# Patient Record
Sex: FEMALE | Race: ASIAN | NOT HISPANIC OR LATINO
[De-identification: names, ages, dates, MRNs, and addresses within clinical notes are randomized per-mention and may not be internally consistent; named-entity substitution may affect disease eponyms.]

---

## 2021-11-23 ENCOUNTER — TRANSCRIPTION ENCOUNTER (OUTPATIENT)
Age: 36
End: 2021-11-23

## 2021-11-23 ENCOUNTER — LABORATORY RESULT (OUTPATIENT)
Age: 36
End: 2021-11-23

## 2021-11-23 ENCOUNTER — APPOINTMENT (OUTPATIENT)
Dept: FAMILY MEDICINE | Facility: CLINIC | Age: 36
End: 2021-11-23
Payer: MEDICAID

## 2021-11-23 VITALS
SYSTOLIC BLOOD PRESSURE: 130 MMHG | TEMPERATURE: 97.2 F | HEART RATE: 102 BPM | OXYGEN SATURATION: 98 % | DIASTOLIC BLOOD PRESSURE: 82 MMHG

## 2021-11-23 VITALS — BODY MASS INDEX: 32.96 KG/M2 | HEIGHT: 67 IN | WEIGHT: 210 LBS

## 2021-11-23 DIAGNOSIS — F17.200 NICOTINE DEPENDENCE, UNSPECIFIED, UNCOMPLICATED: ICD-10-CM

## 2021-11-23 DIAGNOSIS — Z82.49 FAMILY HISTORY OF ISCHEMIC HEART DISEASE AND OTHER DISEASES OF THE CIRCULATORY SYSTEM: ICD-10-CM

## 2021-11-23 DIAGNOSIS — Z83.3 FAMILY HISTORY OF DIABETES MELLITUS: ICD-10-CM

## 2021-11-23 DIAGNOSIS — Z78.9 OTHER SPECIFIED HEALTH STATUS: ICD-10-CM

## 2021-11-23 PROCEDURE — 99385 PREV VISIT NEW AGE 18-39: CPT

## 2021-11-23 RX ORDER — TRIAMCINOLONE ACETONIDE 1 MG/G
0.1 OINTMENT TOPICAL
Qty: 1 | Refills: 1 | Status: ACTIVE | COMMUNITY
Start: 2021-11-23 | End: 1900-01-01

## 2021-11-23 NOTE — PHYSICAL EXAM
[Normal Sclera/Conjunctiva] : normal sclera/conjunctiva [No Focal Deficits] : no focal deficits [Normal] : affect was normal and insight and judgment were intact [de-identified] : dry thickened skin on palms, knuckles

## 2021-11-23 NOTE — HISTORY OF PRESENT ILLNESS
[de-identified] : New patient visit\par Moved from  [place] one year ago.\par High BP through pregnancy. [age] year old, no medications post partum.\par No medication for achiness, pain hurt. \par Gained weight, trying to eat healthier now\par Migraines, ibuprofen 800 mg for relief. \par Dry hands, eczema

## 2021-11-23 NOTE — HEALTH RISK ASSESSMENT
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

## 2021-11-24 LAB
ALBUMIN SERPL ELPH-MCNC: 4.3 G/DL
ALP BLD-CCNC: 161 U/L
ALT SERPL-CCNC: 8 U/L
ANION GAP SERPL CALC-SCNC: 13 MMOL/L
AST SERPL-CCNC: 10 U/L
B BURGDOR IGG+IGM SER QL IB: NORMAL
BASOPHILS # BLD AUTO: 0.12 K/UL
BASOPHILS NFR BLD AUTO: 1 %
BILIRUB SERPL-MCNC: 0.2 MG/DL
BUN SERPL-MCNC: 7 MG/DL
CALCIUM SERPL-MCNC: 9.5 MG/DL
CHLORIDE SERPL-SCNC: 102 MMOL/L
CHOLEST SERPL-MCNC: 174 MG/DL
CO2 SERPL-SCNC: 23 MMOL/L
CREAT SERPL-MCNC: 0.64 MG/DL
EOSINOPHIL # BLD AUTO: 0.23 K/UL
EOSINOPHIL NFR BLD AUTO: 2 %
ERYTHROCYTE [SEDIMENTATION RATE] IN BLOOD BY WESTERGREN METHOD: 41 MM/HR
ESTIMATED AVERAGE GLUCOSE: 117 MG/DL
GLUCOSE SERPL-MCNC: 76 MG/DL
HBA1C MFR BLD HPLC: 5.7 %
HCT VFR BLD CALC: 41 %
HDLC SERPL-MCNC: 56 MG/DL
HGB BLD-MCNC: 12.5 G/DL
IMM GRANULOCYTES NFR BLD AUTO: 0.3 %
LDLC SERPL CALC-MCNC: 107 MG/DL
LYMPHOCYTES # BLD AUTO: 3.2 K/UL
LYMPHOCYTES NFR BLD AUTO: 28 %
MAN DIFF?: NORMAL
MCHC RBC-ENTMCNC: 26.2 PG
MCHC RBC-ENTMCNC: 30.5 GM/DL
MCV RBC AUTO: 86 FL
MONOCYTES # BLD AUTO: 0.76 K/UL
MONOCYTES NFR BLD AUTO: 6.6 %
NEUTROPHILS # BLD AUTO: 7.09 K/UL
NEUTROPHILS NFR BLD AUTO: 62.1 %
NONHDLC SERPL-MCNC: 118 MG/DL
PLATELET # BLD AUTO: 369 K/UL
POTASSIUM SERPL-SCNC: 4.8 MMOL/L
PROT SERPL-MCNC: 7.6 G/DL
RBC # BLD: 4.77 M/UL
RBC # FLD: 14.5 %
RHEUMATOID FACT SER QL: <10 IU/ML
SODIUM SERPL-SCNC: 138 MMOL/L
TRIGL SERPL-MCNC: 57 MG/DL
TSH SERPL-ACNC: 6.68 UIU/ML
WBC # FLD AUTO: 11.43 K/UL

## 2021-11-30 ENCOUNTER — TRANSCRIPTION ENCOUNTER (OUTPATIENT)
Age: 36
End: 2021-11-30

## 2021-11-30 LAB — ANA SER IF-ACNC: NEGATIVE

## 2021-12-07 ENCOUNTER — APPOINTMENT (OUTPATIENT)
Dept: FAMILY MEDICINE | Facility: CLINIC | Age: 36
End: 2021-12-07
Payer: MEDICAID

## 2021-12-07 VITALS
HEART RATE: 74 BPM | OXYGEN SATURATION: 98 % | HEIGHT: 67 IN | DIASTOLIC BLOOD PRESSURE: 80 MMHG | TEMPERATURE: 97.2 F | WEIGHT: 210 LBS | SYSTOLIC BLOOD PRESSURE: 132 MMHG | BODY MASS INDEX: 32.96 KG/M2

## 2021-12-07 PROCEDURE — 99213 OFFICE O/P EST LOW 20 MIN: CPT

## 2021-12-07 NOTE — HISTORY OF PRESENT ILLNESS
[de-identified] : Needs quantiferon and drug screening for job. May need to fill in for care for her mother. \par Has immune titers done previously and has immunity to MMR\par Needs form completed.

## 2021-12-08 ENCOUNTER — APPOINTMENT (OUTPATIENT)
Dept: FAMILY MEDICINE | Facility: CLINIC | Age: 36
End: 2021-12-08

## 2021-12-09 LAB
M TB IFN-G BLD-IMP: NEGATIVE
QUANTIFERON TB PLUS MITOGEN MINUS NIL: 6.84 IU/ML
QUANTIFERON TB PLUS NIL: 0.06 IU/ML
QUANTIFERON TB PLUS TB1 MINUS NIL: -0.03 IU/ML
QUANTIFERON TB PLUS TB2 MINUS NIL: 0 IU/ML

## 2021-12-12 ENCOUNTER — TRANSCRIPTION ENCOUNTER (OUTPATIENT)
Age: 36
End: 2021-12-12

## 2021-12-12 LAB
AMPHET UR-MCNC: NEGATIVE
BARBITURATES UR-MCNC: NEGATIVE
BENZODIAZ UR-MCNC: NEGATIVE
COCAINE METAB.OTHER UR-MCNC: NEGATIVE
CREATININE, URINE: 188.5 MG/DL
METHADONE UR-MCNC: NEGATIVE
METHAQUALONE UR-MCNC: NEGATIVE
OPIATES UR-MCNC: NEGATIVE
PCP UR-MCNC: NEGATIVE
PROPOXYPH UR QL: NEGATIVE
THC UR QL: NEGATIVE

## 2021-12-23 ENCOUNTER — APPOINTMENT (OUTPATIENT)
Dept: FAMILY MEDICINE | Facility: CLINIC | Age: 36
End: 2021-12-23
Payer: MEDICAID

## 2021-12-23 PROCEDURE — 99213 OFFICE O/P EST LOW 20 MIN: CPT | Mod: 95

## 2021-12-23 NOTE — REVIEW OF SYSTEMS
[Nasal Discharge] : nasal discharge [Sore Throat] : sore throat [Postnasal Drip] : postnasal drip [Cough] : cough [Nausea] : nausea [Negative] : Cardiovascular [Fever] : no fever [Chills] : no chills [Fatigue] : no fatigue

## 2021-12-23 NOTE — ASSESSMENT
[FreeTextEntry1] : \par discussed viral vs bacterial infections and discussed OTC treatment options including rest, fluids, hydration, motrin/tylenol PRN \par Use stream inhalation and neti pot PRN\par \par if no improvement in 1 week f/u \par antibiotics sent per above, counseled on antibiotic resistance \par \par sent for covid swab\par if negative start abx

## 2021-12-23 NOTE — HISTORY OF PRESENT ILLNESS
[Home] : at home, [unfilled] , at the time of the visit. [Medical Office: ([facility])___] : at the medical office located in  [Verbal consent obtained from patient] : the patient, [unfilled] [FreeTextEntry8] : Pt presenting for sore throat and phlegm x3 days\par no fevers or chills\par 1 y/.o was sick \par COVID- vaccinated \par trying to get tested, not able to find spot. \par no muscle aches or body aches \par no fatigue \par

## 2021-12-25 ENCOUNTER — TRANSCRIPTION ENCOUNTER (OUTPATIENT)
Age: 36
End: 2021-12-25

## 2021-12-25 LAB
INFLUENZA A RESULT: NOT DETECTED
INFLUENZA B RESULT: NOT DETECTED
RESP SYN VIRUS RESULT: DETECTED
SARS-COV-2 RESULT: NOT DETECTED

## 2022-02-08 ENCOUNTER — APPOINTMENT (OUTPATIENT)
Dept: FAMILY MEDICINE | Facility: CLINIC | Age: 37
End: 2022-02-08
Payer: MEDICAID

## 2022-02-08 DIAGNOSIS — Z86.19 PERSONAL HISTORY OF OTHER INFECTIOUS AND PARASITIC DISEASES: ICD-10-CM

## 2022-02-08 PROCEDURE — 99213 OFFICE O/P EST LOW 20 MIN: CPT | Mod: 95

## 2022-02-08 RX ORDER — AZITHROMYCIN 250 MG/1
250 TABLET, FILM COATED ORAL
Qty: 1 | Refills: 0 | Status: DISCONTINUED | COMMUNITY
Start: 2021-12-23 | End: 2022-02-08

## 2022-02-08 NOTE — HISTORY OF PRESENT ILLNESS
[Home] : at home, [unfilled] , at the time of the visit. [Medical Office: ([facility])___] : at the medical office located in  [FreeTextEntry8] : RSV in December.\par Feeling better but persistent cough. Mucous in throat. \par Took Zpack at  time of diagnosis. \par \par No fatigue or SOB with cough.

## 2022-02-08 NOTE — PHYSICAL EXAM
[No Respiratory Distress] : no respiratory distress  [Normal] : affect was normal and insight and judgment were intact [de-identified] : PND [de-identified] : dry cough

## 2022-03-01 ENCOUNTER — NON-APPOINTMENT (OUTPATIENT)
Age: 37
End: 2022-03-01

## 2022-03-01 ENCOUNTER — APPOINTMENT (OUTPATIENT)
Dept: FAMILY MEDICINE | Facility: CLINIC | Age: 37
End: 2022-03-01
Payer: MEDICAID

## 2022-03-01 VITALS
HEART RATE: 97 BPM | TEMPERATURE: 97.4 F | OXYGEN SATURATION: 98 % | HEIGHT: 67 IN | SYSTOLIC BLOOD PRESSURE: 116 MMHG | BODY MASS INDEX: 31.39 KG/M2 | DIASTOLIC BLOOD PRESSURE: 82 MMHG | WEIGHT: 200 LBS

## 2022-03-01 DIAGNOSIS — Z13.0 ENCOUNTER FOR SCREENING FOR OTHER SUSPECTED ENDOCRINE DISORDER: ICD-10-CM

## 2022-03-01 DIAGNOSIS — Z13.29 ENCOUNTER FOR SCREENING FOR OTHER SUSPECTED ENDOCRINE DISORDER: ICD-10-CM

## 2022-03-01 DIAGNOSIS — L30.9 DERMATITIS, UNSPECIFIED: ICD-10-CM

## 2022-03-01 DIAGNOSIS — Z13.228 ENCOUNTER FOR SCREENING FOR OTHER SUSPECTED ENDOCRINE DISORDER: ICD-10-CM

## 2022-03-01 DIAGNOSIS — Z11.1 ENCOUNTER FOR SCREENING FOR RESPIRATORY TUBERCULOSIS: ICD-10-CM

## 2022-03-01 DIAGNOSIS — Z87.898 PERSONAL HISTORY OF OTHER SPECIFIED CONDITIONS: ICD-10-CM

## 2022-03-01 PROCEDURE — 99214 OFFICE O/P EST MOD 30 MIN: CPT | Mod: 25

## 2022-03-01 PROCEDURE — 93000 ELECTROCARDIOGRAM COMPLETE: CPT

## 2022-03-01 NOTE — PLAN
[FreeTextEntry1] : Blood work done.\par Steroid cream and moisturizer to hands.\par \par Pain relief for costochondritis, topical pain relief.

## 2022-03-01 NOTE — PHYSICAL EXAM
[Normal] : normal rate, regular rhythm, normal S1 and S2 and no murmur heard [de-identified] : tender point of pain left upper chest. Tender left subscapular. [de-identified] : Very dry skin left palm

## 2022-03-01 NOTE — HISTORY OF PRESENT ILLNESS
[FreeTextEntry8] : Left sided chest pain last night. Intermittent, occurs with movement, Feels like pinching, upper left chest and left subscapular. \par Active 2 year old she picks up frequently\par \par Not taking Synthroid, due for repeat TSH.\par \par Rash on hands cleared a bit with steroid cream, much better response with clobetasol gel. \par Needs script.

## 2022-03-02 ENCOUNTER — TRANSCRIPTION ENCOUNTER (OUTPATIENT)
Age: 37
End: 2022-03-02

## 2022-03-02 LAB
ALBUMIN SERPL ELPH-MCNC: 3.9 G/DL
ALP BLD-CCNC: 142 U/L
ALT SERPL-CCNC: 12 U/L
ANION GAP SERPL CALC-SCNC: 12 MMOL/L
AST SERPL-CCNC: 15 U/L
BASOPHILS # BLD AUTO: 0.09 K/UL
BASOPHILS NFR BLD AUTO: 1 %
BILIRUB SERPL-MCNC: 0.3 MG/DL
BUN SERPL-MCNC: 7 MG/DL
CALCIUM SERPL-MCNC: 8.8 MG/DL
CHLORIDE SERPL-SCNC: 106 MMOL/L
CO2 SERPL-SCNC: 23 MMOL/L
CREAT SERPL-MCNC: 0.65 MG/DL
EGFR: 116 ML/MIN/1.73M2
EOSINOPHIL # BLD AUTO: 0.21 K/UL
EOSINOPHIL NFR BLD AUTO: 2.4 %
ERYTHROCYTE [SEDIMENTATION RATE] IN BLOOD BY WESTERGREN METHOD: 27 MM/HR
GLUCOSE SERPL-MCNC: 73 MG/DL
HCT VFR BLD CALC: 39.4 %
HGB BLD-MCNC: 11.9 G/DL
IMM GRANULOCYTES NFR BLD AUTO: 0.2 %
LYMPHOCYTES # BLD AUTO: 3 K/UL
LYMPHOCYTES NFR BLD AUTO: 33.8 %
MAN DIFF?: NORMAL
MCHC RBC-ENTMCNC: 26 PG
MCHC RBC-ENTMCNC: 30.2 GM/DL
MCV RBC AUTO: 86.2 FL
MONOCYTES # BLD AUTO: 0.6 K/UL
MONOCYTES NFR BLD AUTO: 6.8 %
NEUTROPHILS # BLD AUTO: 4.96 K/UL
NEUTROPHILS NFR BLD AUTO: 55.8 %
PLATELET # BLD AUTO: 334 K/UL
POTASSIUM SERPL-SCNC: 4.2 MMOL/L
PROT SERPL-MCNC: 7 G/DL
RBC # BLD: 4.57 M/UL
RBC # FLD: 14.6 %
SODIUM SERPL-SCNC: 141 MMOL/L
TSH SERPL-ACNC: 9.66 UIU/ML
WBC # FLD AUTO: 8.88 K/UL

## 2022-04-01 ENCOUNTER — APPOINTMENT (OUTPATIENT)
Dept: FAMILY MEDICINE | Facility: CLINIC | Age: 37
End: 2022-04-01
Payer: MEDICAID

## 2022-04-01 PROCEDURE — 99213 OFFICE O/P EST LOW 20 MIN: CPT | Mod: 95

## 2022-04-01 NOTE — PHYSICAL EXAM
[No Respiratory Distress] : no respiratory distress  [Normal] : affect was normal and insight and judgment were intact [de-identified] : nasal congestion

## 2022-04-01 NOTE — HISTORY OF PRESENT ILLNESS
[Home] : at home, [unfilled] , at the time of the visit. [Medical Office: ([facility])___] : at the medical office located in  [Verbal consent obtained from patient] : the patient, [unfilled] [FreeTextEntry8] : Cold and congested for a few days. Son was vomiting and slight congestion. Son in ER and negative for COVID.\par  has cold.\par Tired and not sleeping well with the congestion. Eyes teary.

## 2022-04-27 ENCOUNTER — APPOINTMENT (OUTPATIENT)
Dept: FAMILY MEDICINE | Facility: CLINIC | Age: 37
End: 2022-04-27
Payer: MEDICAID

## 2022-04-27 VITALS
BODY MASS INDEX: 31.39 KG/M2 | WEIGHT: 200 LBS | TEMPERATURE: 96.9 F | DIASTOLIC BLOOD PRESSURE: 78 MMHG | HEART RATE: 100 BPM | HEIGHT: 67 IN | SYSTOLIC BLOOD PRESSURE: 112 MMHG | OXYGEN SATURATION: 96 %

## 2022-04-27 DIAGNOSIS — J34.89 OTHER SPECIFIED DISORDERS OF NOSE AND NASAL SINUSES: ICD-10-CM

## 2022-04-27 DIAGNOSIS — Z00.00 ENCOUNTER FOR GENERAL ADULT MEDICAL EXAMINATION W/OUT ABNORMAL FINDINGS: ICD-10-CM

## 2022-04-27 DIAGNOSIS — Z02.83 ENCOUNTER FOR BLOOD-ALCOHOL AND BLOOD-DRUG TEST: ICD-10-CM

## 2022-04-27 DIAGNOSIS — Z87.09 PERSONAL HISTORY OF OTHER DISEASES OF THE RESPIRATORY SYSTEM: ICD-10-CM

## 2022-04-27 DIAGNOSIS — Z02.89 ENCOUNTER FOR OTHER ADMINISTRATIVE EXAMINATIONS: ICD-10-CM

## 2022-04-27 PROCEDURE — 99395 PREV VISIT EST AGE 18-39: CPT

## 2022-04-27 NOTE — PHYSICAL EXAM
[Normal Sclera/Conjunctiva] : normal sclera/conjunctiva [No Focal Deficits] : no focal deficits [Normal] : affect was normal and insight and judgment were intact [de-identified] : dry hands

## 2022-04-27 NOTE — HEALTH RISK ASSESSMENT
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

## 2022-04-27 NOTE — HISTORY OF PRESENT ILLNESS
[de-identified] : Annual CPE\par Hypothyroid\par Rectal pain, bleeding hemorrhoid. Very concerned and wants to see GI. \par Cysts in vaginal area. has not been to GYN recently, needs appointment\par Migraine headaches, was in ER and all scans negative. Blood work was normal. \par \par \par

## 2022-04-28 LAB
ALBUMIN SERPL ELPH-MCNC: 4.1 G/DL
ALP BLD-CCNC: 141 U/L
ALT SERPL-CCNC: 12 U/L
ANION GAP SERPL CALC-SCNC: 12 MMOL/L
AST SERPL-CCNC: 14 U/L
BASOPHILS # BLD AUTO: 0.08 K/UL
BASOPHILS NFR BLD AUTO: 0.8 %
BILIRUB SERPL-MCNC: 0.3 MG/DL
BUN SERPL-MCNC: 8 MG/DL
CALCIUM SERPL-MCNC: 9 MG/DL
CHLORIDE SERPL-SCNC: 105 MMOL/L
CHOLEST SERPL-MCNC: 161 MG/DL
CO2 SERPL-SCNC: 23 MMOL/L
CREAT SERPL-MCNC: 0.64 MG/DL
EGFR: 116 ML/MIN/1.73M2
EOSINOPHIL # BLD AUTO: 0.26 K/UL
EOSINOPHIL NFR BLD AUTO: 2.7 %
ESTIMATED AVERAGE GLUCOSE: 123 MG/DL
GLUCOSE SERPL-MCNC: 76 MG/DL
HBA1C MFR BLD HPLC: 5.9 %
HCT VFR BLD CALC: 40.9 %
HDLC SERPL-MCNC: 49 MG/DL
HGB BLD-MCNC: 12.3 G/DL
IMM GRANULOCYTES NFR BLD AUTO: 0.2 %
LDLC SERPL CALC-MCNC: 96 MG/DL
LYMPHOCYTES # BLD AUTO: 3.73 K/UL
LYMPHOCYTES NFR BLD AUTO: 38.5 %
MAN DIFF?: NORMAL
MCHC RBC-ENTMCNC: 26.2 PG
MCHC RBC-ENTMCNC: 30.1 GM/DL
MCV RBC AUTO: 87.2 FL
MONOCYTES # BLD AUTO: 0.64 K/UL
MONOCYTES NFR BLD AUTO: 6.6 %
NEUTROPHILS # BLD AUTO: 4.97 K/UL
NEUTROPHILS NFR BLD AUTO: 51.2 %
NONHDLC SERPL-MCNC: 112 MG/DL
PLATELET # BLD AUTO: 340 K/UL
POTASSIUM SERPL-SCNC: 4.7 MMOL/L
PROT SERPL-MCNC: 6.9 G/DL
RBC # BLD: 4.69 M/UL
RBC # FLD: 14.4 %
SODIUM SERPL-SCNC: 141 MMOL/L
TRIGL SERPL-MCNC: 77 MG/DL
TSH SERPL-ACNC: 7.29 UIU/ML
WBC # FLD AUTO: 9.7 K/UL

## 2022-06-03 ENCOUNTER — APPOINTMENT (OUTPATIENT)
Dept: FAMILY MEDICINE | Facility: CLINIC | Age: 37
End: 2022-06-03
Payer: MEDICAID

## 2022-06-03 DIAGNOSIS — J01.90 ACUTE SINUSITIS, UNSPECIFIED: ICD-10-CM

## 2022-06-03 PROCEDURE — 99213 OFFICE O/P EST LOW 20 MIN: CPT | Mod: 95

## 2022-06-03 NOTE — PHYSICAL EXAM
[Normal] : no acute distress, well nourished, well developed and well-appearing [de-identified] : nasal congestion PND

## 2022-06-03 NOTE — HISTORY OF PRESENT ILLNESS
[Home] : at home, [unfilled] , at the time of the visit. [Medical Office: ([facility])___] : at the medical office located in  [Verbal consent obtained from patient] : the patient, [unfilled] [FreeTextEntry8] : Sinus congestion started last week. Now thick mucous, PND, sore throat, sinus headache and productive cough.\par No fever.\par OTC symptom relief not helping. No appetite. Fluids and soup good. \par \par Asking about thyroid and if taking medication properly. Would like to see endocrinology in [place].

## 2022-08-02 ENCOUNTER — APPOINTMENT (OUTPATIENT)
Dept: FAMILY MEDICINE | Facility: CLINIC | Age: 37
End: 2022-08-02

## 2022-08-02 VITALS
HEIGHT: 67 IN | BODY MASS INDEX: 34.84 KG/M2 | DIASTOLIC BLOOD PRESSURE: 82 MMHG | WEIGHT: 222 LBS | TEMPERATURE: 97.4 F | OXYGEN SATURATION: 98 % | SYSTOLIC BLOOD PRESSURE: 126 MMHG | HEART RATE: 94 BPM

## 2022-08-02 DIAGNOSIS — R63.5 ABNORMAL WEIGHT GAIN: ICD-10-CM

## 2022-08-02 PROCEDURE — 99214 OFFICE O/P EST MOD 30 MIN: CPT

## 2022-08-02 RX ORDER — LEVOTHYROXINE SODIUM 0.05 MG/1
50 TABLET ORAL
Qty: 30 | Refills: 0 | Status: DISCONTINUED | COMMUNITY
Start: 2022-04-06

## 2022-08-02 RX ORDER — METHYLPREDNISOLONE 4 MG/1
4 TABLET ORAL
Qty: 1 | Refills: 0 | Status: DISCONTINUED | COMMUNITY
Start: 2022-02-08 | End: 2022-08-02

## 2022-08-02 RX ORDER — AMOXICILLIN AND CLAVULANATE POTASSIUM 875; 125 MG/1; MG/1
875-125 TABLET, COATED ORAL
Qty: 14 | Refills: 0 | Status: DISCONTINUED | COMMUNITY
Start: 2022-06-03 | End: 2022-08-02

## 2022-08-02 RX ORDER — PROMETHAZINE HYDROCHLORIDE AND DEXTROMETHORPHAN HYDROBROMIDE ORAL SOLUTION 15; 6.25 MG/5ML; MG/5ML
6.25-15 SOLUTION ORAL
Qty: 210 | Refills: 0 | Status: DISCONTINUED | COMMUNITY
Start: 2022-06-03 | End: 2022-08-02

## 2022-08-02 NOTE — PLAN
[FreeTextEntry1] : Blood work done.\par Back to 50 mcg dose of Synthroid.\par \par May need to see endocrine if unable to tolerate Synthroid

## 2022-08-02 NOTE — HISTORY OF PRESENT ILLNESS
[de-identified] : FU thyroid\par Unable to tolerate 75 mcg dose of Synthroid. Stopped due to severe headaches. She did not go back onto 50 mcg dose\par Gaining weight, no palpitations\par

## 2022-08-03 ENCOUNTER — TRANSCRIPTION ENCOUNTER (OUTPATIENT)
Age: 37
End: 2022-08-03

## 2022-08-03 LAB
ALBUMIN SERPL ELPH-MCNC: 4 G/DL
ALP BLD-CCNC: 145 U/L
ALT SERPL-CCNC: 12 U/L
ANION GAP SERPL CALC-SCNC: 10 MMOL/L
AST SERPL-CCNC: 14 U/L
BASOPHILS # BLD AUTO: 0.08 K/UL
BASOPHILS NFR BLD AUTO: 0.8 %
BILIRUB SERPL-MCNC: 0.2 MG/DL
BUN SERPL-MCNC: 10 MG/DL
CALCIUM SERPL-MCNC: 9.3 MG/DL
CHLORIDE SERPL-SCNC: 104 MMOL/L
CO2 SERPL-SCNC: 24 MMOL/L
CREAT SERPL-MCNC: 0.63 MG/DL
EGFR: 116 ML/MIN/1.73M2
EOSINOPHIL # BLD AUTO: 0.22 K/UL
EOSINOPHIL NFR BLD AUTO: 2.1 %
ESTIMATED AVERAGE GLUCOSE: 117 MG/DL
GLUCOSE SERPL-MCNC: 74 MG/DL
HBA1C MFR BLD HPLC: 5.7 %
HCT VFR BLD CALC: 38.8 %
HGB BLD-MCNC: 11.9 G/DL
IMM GRANULOCYTES NFR BLD AUTO: 0.4 %
LYMPHOCYTES # BLD AUTO: 3.11 K/UL
LYMPHOCYTES NFR BLD AUTO: 30.2 %
MAN DIFF?: NORMAL
MCHC RBC-ENTMCNC: 26.4 PG
MCHC RBC-ENTMCNC: 30.7 GM/DL
MCV RBC AUTO: 86.2 FL
MONOCYTES # BLD AUTO: 0.63 K/UL
MONOCYTES NFR BLD AUTO: 6.1 %
NEUTROPHILS # BLD AUTO: 6.23 K/UL
NEUTROPHILS NFR BLD AUTO: 60.4 %
PLATELET # BLD AUTO: 330 K/UL
POTASSIUM SERPL-SCNC: 4.6 MMOL/L
PROT SERPL-MCNC: 7.1 G/DL
RBC # BLD: 4.5 M/UL
RBC # FLD: 14.9 %
SODIUM SERPL-SCNC: 138 MMOL/L
T4 SERPL-MCNC: 5.1 UG/DL
TSH SERPL-ACNC: 14.3 UIU/ML
WBC # FLD AUTO: 10.31 K/UL

## 2022-08-04 LAB
THYROGLOB AB SERPL-ACNC: <20 IU/ML
THYROPEROXIDASE AB SERPL IA-ACNC: 1803 IU/ML

## 2022-11-03 ENCOUNTER — APPOINTMENT (OUTPATIENT)
Dept: FAMILY MEDICINE | Facility: CLINIC | Age: 37
End: 2022-11-03

## 2022-11-03 VITALS
WEIGHT: 218 LBS | OXYGEN SATURATION: 98 % | BODY MASS INDEX: 34.21 KG/M2 | TEMPERATURE: 96.7 F | HEART RATE: 105 BPM | HEIGHT: 67 IN | DIASTOLIC BLOOD PRESSURE: 64 MMHG | SYSTOLIC BLOOD PRESSURE: 114 MMHG

## 2022-11-03 PROCEDURE — 99214 OFFICE O/P EST MOD 30 MIN: CPT

## 2022-11-03 NOTE — HISTORY OF PRESENT ILLNESS
[de-identified] : PT presenting for recheck of thyroid. \par Taking levothyroxine 50 mcg daily x 6 months. \par woiuld to recheck levels \par

## 2022-11-03 NOTE — ASSESSMENT
[FreeTextEntry1] : check TSH levels \par \par filled out form for Home care worked- check Quantiferon and drug screen

## 2022-11-04 LAB
T3 SERPL-MCNC: 103 NG/DL
T4 FREE SERPL-MCNC: 1.1 NG/DL
TSH SERPL-ACNC: 4.05 UIU/ML

## 2022-11-08 LAB
M TB IFN-G BLD-IMP: NEGATIVE
QUANTIFERON TB PLUS MITOGEN MINUS NIL: >10 IU/ML
QUANTIFERON TB PLUS NIL: 0.05 IU/ML
QUANTIFERON TB PLUS TB1 MINUS NIL: -0.01 IU/ML
QUANTIFERON TB PLUS TB2 MINUS NIL: -0.01 IU/ML

## 2022-11-12 LAB
AMPHET UR-MCNC: NEGATIVE
BARBITURATES UR-MCNC: NEGATIVE
BENZODIAZ UR-MCNC: NEGATIVE
COCAINE METAB.OTHER UR-MCNC: NEGATIVE
CREATININE, URINE: 239.4 MG/DL
METHADONE UR-MCNC: NEGATIVE
METHAQUALONE UR-MCNC: NEGATIVE
OPIATES UR-MCNC: NEGATIVE
PCP UR-MCNC: NEGATIVE
PROPOXYPH UR QL: NEGATIVE
THC UR QL: NEGATIVE

## 2022-12-05 ENCOUNTER — APPOINTMENT (OUTPATIENT)
Dept: ENDOCRINOLOGY | Facility: CLINIC | Age: 37
End: 2022-12-05

## 2022-12-06 ENCOUNTER — APPOINTMENT (OUTPATIENT)
Dept: FAMILY MEDICINE | Facility: CLINIC | Age: 37
End: 2022-12-06

## 2022-12-06 DIAGNOSIS — J01.90 ACUTE SINUSITIS, UNSPECIFIED: ICD-10-CM

## 2022-12-06 PROCEDURE — ZZZZZ: CPT

## 2023-05-09 ENCOUNTER — APPOINTMENT (OUTPATIENT)
Dept: FAMILY MEDICINE | Facility: CLINIC | Age: 38
End: 2023-05-09

## 2023-08-04 ENCOUNTER — OUTPATIENT (OUTPATIENT)
Dept: OUTPATIENT SERVICES | Facility: HOSPITAL | Age: 38
LOS: 1 days | End: 2023-08-04

## 2023-08-04 ENCOUNTER — APPOINTMENT (OUTPATIENT)
Dept: OBGYN | Facility: HOSPITAL | Age: 38
End: 2023-08-04

## 2023-08-04 LAB
HCG UR QL: POSITIVE
QUALITY CONTROL: YES

## 2023-08-07 DIAGNOSIS — Z32.00 ENCOUNTER FOR PREGNANCY TEST, RESULT UNKNOWN: ICD-10-CM

## 2023-08-30 ENCOUNTER — RESULT REVIEW (OUTPATIENT)
Age: 38
End: 2023-08-30

## 2023-08-30 ENCOUNTER — OUTPATIENT (OUTPATIENT)
Dept: OUTPATIENT SERVICES | Facility: HOSPITAL | Age: 38
LOS: 1 days | End: 2023-08-30

## 2023-08-30 ENCOUNTER — APPOINTMENT (OUTPATIENT)
Dept: OBGYN | Facility: HOSPITAL | Age: 38
End: 2023-08-30

## 2023-08-30 VITALS
SYSTOLIC BLOOD PRESSURE: 108 MMHG | DIASTOLIC BLOOD PRESSURE: 60 MMHG | WEIGHT: 224.3 LBS | HEIGHT: 67 IN | TEMPERATURE: 97.3 F | HEART RATE: 75 BPM | BODY MASS INDEX: 35.2 KG/M2

## 2023-08-30 DIAGNOSIS — U07.1 COVID-19: ICD-10-CM

## 2023-08-30 DIAGNOSIS — K62.89 OTHER SPECIFIED DISEASES OF ANUS AND RECTUM: ICD-10-CM

## 2023-08-30 DIAGNOSIS — R07.89 OTHER CHEST PAIN: ICD-10-CM

## 2023-08-30 DIAGNOSIS — R73.09 OTHER ABNORMAL GLUCOSE: ICD-10-CM

## 2023-08-30 DIAGNOSIS — Z13.0 ENCOUNTER FOR SCREENING FOR DISEASES OF THE BLOOD AND BLOOD-FORMING ORGANS AND CERTAIN DISORDERS INVOLVING THE IMMUNE MECHANISM: ICD-10-CM

## 2023-08-30 DIAGNOSIS — R70.0 ELEVATED ERYTHROCYTE SEDIMENTATION RATE: ICD-10-CM

## 2023-08-30 DIAGNOSIS — M94.0 CHONDROCOSTAL JUNCTION SYNDROME [TIETZE]: ICD-10-CM

## 2023-08-30 DIAGNOSIS — Z87.39 PERSONAL HISTORY OF OTHER DISEASES OF THE MUSCULOSKELETAL SYSTEM AND CONNECTIVE TISSUE: ICD-10-CM

## 2023-08-30 DIAGNOSIS — Z87.898 PERSONAL HISTORY OF OTHER SPECIFIED CONDITIONS: ICD-10-CM

## 2023-08-30 LAB
24R-OH-CALCIDIOL SERPL-MCNC: 19.2 NG/ML — LOW (ref 30–80)
A1C WITH ESTIMATED AVERAGE GLUCOSE RESULT: 5.6 % — SIGNIFICANT CHANGE UP (ref 4–5.6)
ALBUMIN SERPL ELPH-MCNC: 3.7 G/DL — SIGNIFICANT CHANGE UP (ref 3.3–5)
ALP SERPL-CCNC: 106 U/L — SIGNIFICANT CHANGE UP (ref 40–120)
ALT FLD-CCNC: 14 U/L — SIGNIFICANT CHANGE UP (ref 4–33)
ANION GAP SERPL CALC-SCNC: 11 MMOL/L — SIGNIFICANT CHANGE UP (ref 7–14)
APPEARANCE UR: CLEAR — SIGNIFICANT CHANGE UP
APTT BLD: 34.9 SEC — SIGNIFICANT CHANGE UP (ref 24.5–35.6)
AST SERPL-CCNC: 11 U/L — SIGNIFICANT CHANGE UP (ref 4–32)
BILIRUB SERPL-MCNC: 0.2 MG/DL — SIGNIFICANT CHANGE UP (ref 0.2–1.2)
BILIRUB UR-MCNC: NEGATIVE — SIGNIFICANT CHANGE UP
BUN SERPL-MCNC: 7 MG/DL — SIGNIFICANT CHANGE UP (ref 7–23)
CALCIUM SERPL-MCNC: 8.5 MG/DL — SIGNIFICANT CHANGE UP (ref 8.4–10.5)
CHLORIDE SERPL-SCNC: 102 MMOL/L — SIGNIFICANT CHANGE UP (ref 98–107)
CO2 SERPL-SCNC: 22 MMOL/L — SIGNIFICANT CHANGE UP (ref 22–31)
COLOR SPEC: YELLOW — SIGNIFICANT CHANGE UP
CREAT ?TM UR-MCNC: 161 MG/DL — SIGNIFICANT CHANGE UP
CREAT SERPL-MCNC: 0.48 MG/DL — LOW (ref 0.5–1.3)
DIFF PNL FLD: NEGATIVE — SIGNIFICANT CHANGE UP
EGFR: 123 ML/MIN/1.73M2 — SIGNIFICANT CHANGE UP
ESTIMATED AVERAGE GLUCOSE: 114 — SIGNIFICANT CHANGE UP
GLUCOSE 1H P MEAL SERPL-MCNC: 146 MG/DL — HIGH (ref 70–134)
GLUCOSE SERPL-MCNC: 143 MG/DL — HIGH (ref 70–99)
GLUCOSE UR QL: NEGATIVE MG/DL — SIGNIFICANT CHANGE UP
HBV SURFACE AG SER-ACNC: SIGNIFICANT CHANGE UP
HCT VFR BLD CALC: 37.3 % — SIGNIFICANT CHANGE UP (ref 34.5–45)
HCV AB S/CO SERPL IA: 0.12 S/CO — SIGNIFICANT CHANGE UP (ref 0–0.99)
HCV AB SERPL-IMP: SIGNIFICANT CHANGE UP
HGB BLD-MCNC: 12 G/DL — SIGNIFICANT CHANGE UP (ref 11.5–15.5)
HIV 1+2 AB+HIV1 P24 AG SERPL QL IA: SIGNIFICANT CHANGE UP
INR BLD: 0.97 RATIO — SIGNIFICANT CHANGE UP (ref 0.85–1.18)
KETONES UR-MCNC: NEGATIVE MG/DL — SIGNIFICANT CHANGE UP
LDH SERPL L TO P-CCNC: 154 U/L — SIGNIFICANT CHANGE UP (ref 135–225)
LEUKOCYTE ESTERASE UR-ACNC: NEGATIVE — SIGNIFICANT CHANGE UP
MCHC RBC-ENTMCNC: 27.4 PG — SIGNIFICANT CHANGE UP (ref 27–34)
MCHC RBC-ENTMCNC: 32.2 GM/DL — SIGNIFICANT CHANGE UP (ref 32–36)
MCV RBC AUTO: 85.2 FL — SIGNIFICANT CHANGE UP (ref 80–100)
MEV IGG SER-ACNC: >300 AU/ML — SIGNIFICANT CHANGE UP
MEV IGG+IGM SER-IMP: POSITIVE — SIGNIFICANT CHANGE UP
NITRITE UR-MCNC: NEGATIVE — SIGNIFICANT CHANGE UP
NRBC # BLD: 0 /100 WBCS — SIGNIFICANT CHANGE UP (ref 0–0)
NRBC # FLD: 0 K/UL — SIGNIFICANT CHANGE UP (ref 0–0)
PH UR: 6 — SIGNIFICANT CHANGE UP (ref 5–8)
PLATELET # BLD AUTO: 301 K/UL — SIGNIFICANT CHANGE UP (ref 150–400)
POTASSIUM SERPL-MCNC: 3.3 MMOL/L — LOW (ref 3.5–5.3)
POTASSIUM SERPL-SCNC: 3.3 MMOL/L — LOW (ref 3.5–5.3)
PROT ?TM UR-MCNC: 19 MG/DL — SIGNIFICANT CHANGE UP
PROT SERPL-MCNC: 7 G/DL — SIGNIFICANT CHANGE UP (ref 6–8.3)
PROT UR-MCNC: NEGATIVE MG/DL — SIGNIFICANT CHANGE UP
PROT/CREAT UR-RTO: 0.1 RATIO — SIGNIFICANT CHANGE UP (ref 0–0.2)
PROTHROM AB SERPL-ACNC: 11 SEC — SIGNIFICANT CHANGE UP (ref 9.5–13)
RBC # BLD: 4.38 M/UL — SIGNIFICANT CHANGE UP (ref 3.8–5.2)
RBC # FLD: 13.5 % — SIGNIFICANT CHANGE UP (ref 10.3–14.5)
RUBV IGG SER-ACNC: 11.1 INDEX — SIGNIFICANT CHANGE UP
RUBV IGG SER-IMP: POSITIVE — SIGNIFICANT CHANGE UP
SODIUM SERPL-SCNC: 135 MMOL/L — SIGNIFICANT CHANGE UP (ref 135–145)
SP GR SPEC: 1.03 — SIGNIFICANT CHANGE UP (ref 1–1.03)
T PALLIDUM AB TITR SER: NEGATIVE — SIGNIFICANT CHANGE UP
T4 FREE SERPL-MCNC: 1.1 NG/DL — SIGNIFICANT CHANGE UP (ref 0.9–1.8)
TSH SERPL-MCNC: 3.48 UIU/ML — SIGNIFICANT CHANGE UP (ref 0.27–4.2)
URATE SERPL-MCNC: 3 MG/DL — SIGNIFICANT CHANGE UP (ref 2.5–7)
UROBILINOGEN FLD QL: 1 MG/DL — SIGNIFICANT CHANGE UP (ref 0.2–1)
WBC # BLD: 9.34 K/UL — SIGNIFICANT CHANGE UP (ref 3.8–10.5)
WBC # FLD AUTO: 9.34 K/UL — SIGNIFICANT CHANGE UP (ref 3.8–10.5)

## 2023-08-31 DIAGNOSIS — Z87.59 PERSONAL HISTORY OF OTHER COMPLICATIONS OF PREGNANCY, CHILDBIRTH AND THE PUERPERIUM: ICD-10-CM

## 2023-08-31 DIAGNOSIS — E03.9 HYPOTHYROIDISM, UNSPECIFIED: ICD-10-CM

## 2023-08-31 DIAGNOSIS — O09.529 SUPERVISION OF ELDERLY MULTIGRAVIDA, UNSPECIFIED TRIMESTER: ICD-10-CM

## 2023-08-31 DIAGNOSIS — R07.89 OTHER CHEST PAIN: ICD-10-CM

## 2023-08-31 DIAGNOSIS — E66.9 OBESITY, UNSPECIFIED: ICD-10-CM

## 2023-08-31 LAB
C TRACH RRNA SPEC QL NAA+PROBE: SIGNIFICANT CHANGE UP
HPV HIGH+LOW RISK DNA PNL CVX: DETECTED
LEAD BLD-MCNC: <1 UG/DL — SIGNIFICANT CHANGE UP (ref 0–3.4)
N GONORRHOEA RRNA SPEC QL NAA+PROBE: SIGNIFICANT CHANGE UP
SPECIMEN SOURCE: SIGNIFICANT CHANGE UP

## 2023-09-01 LAB
CULTURE RESULTS: SIGNIFICANT CHANGE UP
GAMMA INTERFERON BACKGROUND BLD IA-ACNC: 0.05 IU/ML — SIGNIFICANT CHANGE UP
HPV GENOTYPE 16: SIGNIFICANT CHANGE UP
HPV GENOTYPE 18/45: DETECTED
M TB IFN-G BLD-IMP: NEGATIVE — SIGNIFICANT CHANGE UP
M TB IFN-G CD4+ BCKGRND COR BLD-ACNC: -0.01 IU/ML — SIGNIFICANT CHANGE UP
M TB IFN-G CD4+CD8+ BCKGRND COR BLD-ACNC: -0.01 IU/ML — SIGNIFICANT CHANGE UP
QUANT TB PLUS MITOGEN MINUS NIL: 8.05 IU/ML — SIGNIFICANT CHANGE UP
SPECIMEN SOURCE: SIGNIFICANT CHANGE UP

## 2023-09-03 LAB — CYTOLOGY SPEC DOC CYTO: SIGNIFICANT CHANGE UP

## 2023-09-05 ENCOUNTER — NON-APPOINTMENT (OUTPATIENT)
Age: 38
End: 2023-09-05

## 2023-09-08 ENCOUNTER — RESULT REVIEW (OUTPATIENT)
Age: 38
End: 2023-09-08

## 2023-09-08 ENCOUNTER — APPOINTMENT (OUTPATIENT)
Dept: OBGYN | Facility: HOSPITAL | Age: 38
End: 2023-09-08

## 2023-09-08 ENCOUNTER — OUTPATIENT (OUTPATIENT)
Dept: OUTPATIENT SERVICES | Facility: HOSPITAL | Age: 38
LOS: 1 days | End: 2023-09-08

## 2023-09-08 DIAGNOSIS — Z34.91 ENCOUNTER FOR SUPERVISION OF NORMAL PREGNANCY, UNSPECIFIED, FIRST TRIMESTER: ICD-10-CM

## 2023-09-08 LAB
GESTATIONAL GTT PNL UR+SERPL: 78 MG/DL — SIGNIFICANT CHANGE UP (ref 70–94)
GLUCOSE 1H P CHAL SERPL-MCNC: 157 MG/DL — SIGNIFICANT CHANGE UP (ref 70–179)
GTT GEST 2H PNL UR+SERPL: 131 MG/DL — SIGNIFICANT CHANGE UP (ref 70–154)
GTT GEST 3H PNL SERPL: 81 MG/DL — SIGNIFICANT CHANGE UP (ref 70–139)

## 2023-09-27 ENCOUNTER — NON-APPOINTMENT (OUTPATIENT)
Age: 38
End: 2023-09-27

## 2023-09-28 ENCOUNTER — APPOINTMENT (OUTPATIENT)
Dept: OBGYN | Facility: HOSPITAL | Age: 38
End: 2023-09-28

## 2023-09-28 ENCOUNTER — RESULT REVIEW (OUTPATIENT)
Age: 38
End: 2023-09-28

## 2023-09-28 ENCOUNTER — OUTPATIENT (OUTPATIENT)
Dept: OUTPATIENT SERVICES | Facility: HOSPITAL | Age: 38
LOS: 1 days | End: 2023-09-28

## 2023-09-28 ENCOUNTER — APPOINTMENT (OUTPATIENT)
Dept: MATERNAL FETAL MEDICINE | Facility: HOSPITAL | Age: 38
End: 2023-09-28

## 2023-09-28 VITALS
WEIGHT: 225 LBS | TEMPERATURE: 98 F | BODY MASS INDEX: 35.24 KG/M2 | DIASTOLIC BLOOD PRESSURE: 69 MMHG | SYSTOLIC BLOOD PRESSURE: 109 MMHG | HEART RATE: 85 BPM

## 2023-09-28 LAB
ALBUMIN SERPL ELPH-MCNC: 3.7 G/DL — SIGNIFICANT CHANGE UP (ref 3.3–5)
ALP SERPL-CCNC: 103 U/L — SIGNIFICANT CHANGE UP (ref 40–120)
ALT FLD-CCNC: 11 U/L — SIGNIFICANT CHANGE UP (ref 4–33)
ANION GAP SERPL CALC-SCNC: 10 MMOL/L — SIGNIFICANT CHANGE UP (ref 7–14)
AST SERPL-CCNC: 11 U/L — SIGNIFICANT CHANGE UP (ref 4–32)
BILIRUB SERPL-MCNC: 0.2 MG/DL — SIGNIFICANT CHANGE UP (ref 0.2–1.2)
BUN SERPL-MCNC: 6 MG/DL — LOW (ref 7–23)
CALCIUM SERPL-MCNC: 8.8 MG/DL — SIGNIFICANT CHANGE UP (ref 8.4–10.5)
CHLORIDE SERPL-SCNC: 104 MMOL/L — SIGNIFICANT CHANGE UP (ref 98–107)
CO2 SERPL-SCNC: 22 MMOL/L — SIGNIFICANT CHANGE UP (ref 22–31)
CREAT SERPL-MCNC: 0.54 MG/DL — SIGNIFICANT CHANGE UP (ref 0.5–1.3)
EGFR: 119 ML/MIN/1.73M2 — SIGNIFICANT CHANGE UP
GLUCOSE SERPL-MCNC: 83 MG/DL — SIGNIFICANT CHANGE UP (ref 70–99)
POTASSIUM SERPL-MCNC: 4 MMOL/L — SIGNIFICANT CHANGE UP (ref 3.5–5.3)
POTASSIUM SERPL-SCNC: 4 MMOL/L — SIGNIFICANT CHANGE UP (ref 3.5–5.3)
PROT SERPL-MCNC: 6.8 G/DL — SIGNIFICANT CHANGE UP (ref 6–8.3)
SODIUM SERPL-SCNC: 136 MMOL/L — SIGNIFICANT CHANGE UP (ref 135–145)

## 2023-09-29 DIAGNOSIS — E66.9 OBESITY, UNSPECIFIED: ICD-10-CM

## 2023-09-29 DIAGNOSIS — O09.529 SUPERVISION OF ELDERLY MULTIGRAVIDA, UNSPECIFIED TRIMESTER: ICD-10-CM

## 2023-09-29 DIAGNOSIS — Z87.59 PERSONAL HISTORY OF OTHER COMPLICATIONS OF PREGNANCY, CHILDBIRTH AND THE PUERPERIUM: ICD-10-CM

## 2023-09-29 DIAGNOSIS — Z34.91 ENCOUNTER FOR SUPERVISION OF NORMAL PREGNANCY, UNSPECIFIED, FIRST TRIMESTER: ICD-10-CM

## 2023-10-06 ENCOUNTER — APPOINTMENT (OUTPATIENT)
Dept: ANTEPARTUM | Facility: CLINIC | Age: 38
End: 2023-10-06
Payer: MEDICAID

## 2023-10-06 ENCOUNTER — APPOINTMENT (OUTPATIENT)
Dept: MATERNAL FETAL MEDICINE | Facility: HOSPITAL | Age: 38
End: 2023-10-06

## 2023-10-06 ENCOUNTER — ASOB RESULT (OUTPATIENT)
Age: 38
End: 2023-10-06

## 2023-10-06 PROCEDURE — 76813 OB US NUCHAL MEAS 1 GEST: CPT | Mod: 59

## 2023-10-06 PROCEDURE — 76801 OB US < 14 WKS SINGLE FETUS: CPT

## 2023-10-10 ENCOUNTER — NON-APPOINTMENT (OUTPATIENT)
Age: 38
End: 2023-10-10

## 2023-10-12 ENCOUNTER — RESULT REVIEW (OUTPATIENT)
Age: 38
End: 2023-10-12

## 2023-10-12 ENCOUNTER — OUTPATIENT (OUTPATIENT)
Dept: OUTPATIENT SERVICES | Facility: HOSPITAL | Age: 38
LOS: 1 days | End: 2023-10-12

## 2023-10-12 ENCOUNTER — APPOINTMENT (OUTPATIENT)
Dept: OBGYN | Facility: HOSPITAL | Age: 38
End: 2023-10-12
Payer: MEDICAID

## 2023-10-12 VITALS
WEIGHT: 224 LBS | BODY MASS INDEX: 35.16 KG/M2 | SYSTOLIC BLOOD PRESSURE: 99 MMHG | HEART RATE: 113 BPM | DIASTOLIC BLOOD PRESSURE: 74 MMHG | HEIGHT: 67 IN | TEMPERATURE: 98.1 F

## 2023-10-12 LAB
COLLECT DURATION TIME UR: 24 HR — SIGNIFICANT CHANGE UP
COLLECT DURATION TIME UR: 24 HR — SIGNIFICANT CHANGE UP
PROT 24H UR-MRATE: 33 MG/24 H — SIGNIFICANT CHANGE UP
PROT 24H UR-MRATE: 33 MG/24 H — SIGNIFICANT CHANGE UP
TOTAL VOLUME - 24 HOUR: 400 ML — SIGNIFICANT CHANGE UP
TOTAL VOLUME - 24 HOUR: 400 ML — SIGNIFICANT CHANGE UP
URINE CREATININE CALCULATION: 0.4 G/24 H — LOW (ref 0.6–1.6)
URINE CREATININE CALCULATION: 0.4 G/24 H — LOW (ref 0.6–1.6)

## 2023-10-12 PROCEDURE — XXXXX: CPT

## 2023-10-16 DIAGNOSIS — R87.810 CERVICAL HIGH RISK HUMAN PAPILLOMAVIRUS (HPV) DNA TEST POSITIVE: ICD-10-CM

## 2023-10-26 ENCOUNTER — RESULT REVIEW (OUTPATIENT)
Age: 38
End: 2023-10-26

## 2023-10-26 ENCOUNTER — APPOINTMENT (OUTPATIENT)
Dept: OBGYN | Facility: HOSPITAL | Age: 38
End: 2023-10-26
Payer: MEDICAID

## 2023-10-26 ENCOUNTER — APPOINTMENT (OUTPATIENT)
Dept: MATERNAL FETAL MEDICINE | Facility: HOSPITAL | Age: 38
End: 2023-10-26
Payer: MEDICAID

## 2023-10-26 ENCOUNTER — OUTPATIENT (OUTPATIENT)
Dept: OUTPATIENT SERVICES | Facility: HOSPITAL | Age: 38
LOS: 1 days | End: 2023-10-26

## 2023-10-26 VITALS
BODY MASS INDEX: 35.31 KG/M2 | SYSTOLIC BLOOD PRESSURE: 103 MMHG | DIASTOLIC BLOOD PRESSURE: 59 MMHG | TEMPERATURE: 98.1 F | WEIGHT: 225 LBS | HEART RATE: 94 BPM | HEIGHT: 67 IN

## 2023-10-26 DIAGNOSIS — Z01.419 ENCOUNTER FOR GYNECOLOGICAL EXAMINATION (GENERAL) (ROUTINE) W/OUT ABNORMAL FINDINGS: ICD-10-CM

## 2023-10-26 LAB
TSH SERPL-MCNC: 6.22 UIU/ML — HIGH (ref 0.27–4.2)
TSH SERPL-MCNC: 6.22 UIU/ML — HIGH (ref 0.27–4.2)

## 2023-10-26 PROCEDURE — 76813 OB US NUCHAL MEAS 1 GEST: CPT | Mod: 26

## 2023-10-26 PROCEDURE — 76801 OB US < 14 WKS SINGLE FETUS: CPT | Mod: 26

## 2023-10-26 RX ORDER — IBUPROFEN 800 MG/1
800 TABLET, FILM COATED ORAL 3 TIMES DAILY
Qty: 60 | Refills: 1 | Status: DISCONTINUED | COMMUNITY
Start: 2021-11-23 | End: 2023-10-26

## 2023-10-26 RX ORDER — KRILL/OM-3/DHA/EPA/PHOSPHO/AST 1000-230MG
81 CAPSULE ORAL DAILY
Qty: 60 | Refills: 2 | Status: DISCONTINUED | COMMUNITY
Start: 2023-09-28 | End: 2023-10-26

## 2023-10-26 RX ORDER — AZITHROMYCIN 250 MG/1
250 TABLET, FILM COATED ORAL
Qty: 1 | Refills: 0 | Status: DISCONTINUED | COMMUNITY
Start: 2022-12-06 | End: 2023-10-26

## 2023-10-27 DIAGNOSIS — Z34.82 ENCOUNTER FOR SUPERVISION OF OTHER NORMAL PREGNANCY, SECOND TRIMESTER: ICD-10-CM

## 2023-10-27 DIAGNOSIS — E03.9 HYPOTHYROIDISM, UNSPECIFIED: ICD-10-CM

## 2023-10-31 LAB
AFP ADJ MOM SERPL: 1.2 — SIGNIFICANT CHANGE UP
AFP ADJ MOM SERPL: 1.2 — SIGNIFICANT CHANGE UP
AFP INTERP SERPL-IMP: SIGNIFICANT CHANGE UP
AFP SERPL-MCNC: 32.6 NG/ML — SIGNIFICANT CHANGE UP
AFP SERPL-MCNC: 32.6 NG/ML — SIGNIFICANT CHANGE UP
AGE AT DELIVERY: 40.6 YR — SIGNIFICANT CHANGE UP
AGE AT DELIVERY: 40.6 YR — SIGNIFICANT CHANGE UP
ALPHA FETOPROTEIN SERUM COMMENT: SIGNIFICANT CHANGE UP
ALPHA FETOPROTEIN SERUM COMMENT: SIGNIFICANT CHANGE UP
ALPHA FETOPROTEIN SERUM RESULTS: SIGNIFICANT CHANGE UP
ALPHA FETOPROTEIN SERUM RESULTS: SIGNIFICANT CHANGE UP
GA METHOD: SIGNIFICANT CHANGE UP
GA METHOD: SIGNIFICANT CHANGE UP
GA: 16.7 WEEKS — SIGNIFICANT CHANGE UP
GA: 16.7 WEEKS — SIGNIFICANT CHANGE UP
IDDM PATIENT QL: NO — SIGNIFICANT CHANGE UP
IDDM PATIENT QL: NO — SIGNIFICANT CHANGE UP
MULTIPLE PREGNANCY: NO — SIGNIFICANT CHANGE UP
MULTIPLE PREGNANCY: NO — SIGNIFICANT CHANGE UP
NEURAL TUBE DEFECT RISK FETUS: 6499 — SIGNIFICANT CHANGE UP
NEURAL TUBE DEFECT RISK FETUS: 6499 — SIGNIFICANT CHANGE UP
RACE AFP: SIGNIFICANT CHANGE UP
RACE AFP: SIGNIFICANT CHANGE UP

## 2023-11-02 ENCOUNTER — NON-APPOINTMENT (OUTPATIENT)
Age: 38
End: 2023-11-02

## 2023-11-13 ENCOUNTER — APPOINTMENT (OUTPATIENT)
Dept: MATERNAL FETAL MEDICINE | Facility: HOSPITAL | Age: 38
End: 2023-11-13
Payer: MEDICAID

## 2023-11-13 ENCOUNTER — OUTPATIENT (OUTPATIENT)
Dept: OUTPATIENT SERVICES | Facility: HOSPITAL | Age: 38
LOS: 1 days | End: 2023-11-13

## 2023-11-13 ENCOUNTER — ASOB RESULT (OUTPATIENT)
Age: 38
End: 2023-11-13

## 2023-11-13 ENCOUNTER — APPOINTMENT (OUTPATIENT)
Dept: OBGYN | Facility: HOSPITAL | Age: 38
End: 2023-11-13
Payer: MEDICAID

## 2023-11-13 VITALS
SYSTOLIC BLOOD PRESSURE: 117 MMHG | BODY MASS INDEX: 35.79 KG/M2 | DIASTOLIC BLOOD PRESSURE: 69 MMHG | HEIGHT: 67 IN | WEIGHT: 228 LBS | TEMPERATURE: 97.7 F | HEART RATE: 77 BPM

## 2023-11-13 PROCEDURE — 76811 OB US DETAILED SNGL FETUS: CPT | Mod: 26

## 2023-11-14 DIAGNOSIS — E03.9 HYPOTHYROIDISM, UNSPECIFIED: ICD-10-CM

## 2023-11-14 DIAGNOSIS — E55.9 VITAMIN D DEFICIENCY, UNSPECIFIED: ICD-10-CM

## 2023-11-14 DIAGNOSIS — O09.529 SUPERVISION OF ELDERLY MULTIGRAVIDA, UNSPECIFIED TRIMESTER: ICD-10-CM

## 2023-11-30 ENCOUNTER — ASOB RESULT (OUTPATIENT)
Age: 38
End: 2023-11-30

## 2023-11-30 ENCOUNTER — APPOINTMENT (OUTPATIENT)
Dept: MATERNAL FETAL MEDICINE | Facility: HOSPITAL | Age: 38
End: 2023-11-30
Payer: MEDICAID

## 2023-11-30 PROCEDURE — 76816 OB US FOLLOW-UP PER FETUS: CPT | Mod: 26

## 2023-12-12 ENCOUNTER — NON-APPOINTMENT (OUTPATIENT)
Age: 38
End: 2023-12-12

## 2023-12-13 ENCOUNTER — APPOINTMENT (OUTPATIENT)
Dept: OBGYN | Facility: HOSPITAL | Age: 38
End: 2023-12-13

## 2023-12-18 ENCOUNTER — APPOINTMENT (OUTPATIENT)
Dept: OBGYN | Facility: HOSPITAL | Age: 38
End: 2023-12-18

## 2023-12-18 ENCOUNTER — RESULT REVIEW (OUTPATIENT)
Age: 38
End: 2023-12-18

## 2023-12-18 ENCOUNTER — OUTPATIENT (OUTPATIENT)
Dept: OUTPATIENT SERVICES | Facility: HOSPITAL | Age: 38
LOS: 1 days | End: 2023-12-18

## 2023-12-18 VITALS
SYSTOLIC BLOOD PRESSURE: 120 MMHG | HEART RATE: 84 BPM | BODY MASS INDEX: 37.35 KG/M2 | HEIGHT: 67 IN | WEIGHT: 238 LBS | DIASTOLIC BLOOD PRESSURE: 68 MMHG | TEMPERATURE: 98 F

## 2023-12-18 LAB
TSH SERPL-MCNC: 3.33 UIU/ML — SIGNIFICANT CHANGE UP (ref 0.27–4.2)
TSH SERPL-MCNC: 3.33 UIU/ML — SIGNIFICANT CHANGE UP (ref 0.27–4.2)

## 2023-12-19 DIAGNOSIS — R05.9 COUGH, UNSPECIFIED: ICD-10-CM

## 2023-12-19 DIAGNOSIS — O09.529 SUPERVISION OF ELDERLY MULTIGRAVIDA, UNSPECIFIED TRIMESTER: ICD-10-CM

## 2023-12-19 LAB
VZV IGG FLD QL IA: 2081 INDEX — SIGNIFICANT CHANGE UP
VZV IGG FLD QL IA: 2081 INDEX — SIGNIFICANT CHANGE UP
VZV IGG FLD QL IA: POSITIVE — SIGNIFICANT CHANGE UP
VZV IGG FLD QL IA: POSITIVE — SIGNIFICANT CHANGE UP

## 2024-01-11 ENCOUNTER — APPOINTMENT (OUTPATIENT)
Dept: MATERNAL FETAL MEDICINE | Facility: HOSPITAL | Age: 39
End: 2024-01-11

## 2024-01-16 ENCOUNTER — NON-APPOINTMENT (OUTPATIENT)
Age: 39
End: 2024-01-16

## 2024-01-22 ENCOUNTER — APPOINTMENT (OUTPATIENT)
Dept: OBGYN | Facility: HOSPITAL | Age: 39
End: 2024-01-22

## 2024-01-22 ENCOUNTER — OUTPATIENT (OUTPATIENT)
Dept: OUTPATIENT SERVICES | Facility: HOSPITAL | Age: 39
LOS: 1 days | End: 2024-01-22

## 2024-01-22 VITALS
WEIGHT: 244 LBS | DIASTOLIC BLOOD PRESSURE: 75 MMHG | BODY MASS INDEX: 38.3 KG/M2 | SYSTOLIC BLOOD PRESSURE: 115 MMHG | HEART RATE: 97 BPM | TEMPERATURE: 98.1 F | HEIGHT: 67 IN

## 2024-01-23 ENCOUNTER — RESULT REVIEW (OUTPATIENT)
Age: 39
End: 2024-01-23

## 2024-01-23 ENCOUNTER — APPOINTMENT (OUTPATIENT)
Dept: OBGYN | Facility: HOSPITAL | Age: 39
End: 2024-01-23

## 2024-01-23 ENCOUNTER — OUTPATIENT (OUTPATIENT)
Dept: OUTPATIENT SERVICES | Facility: HOSPITAL | Age: 39
LOS: 1 days | End: 2024-01-23

## 2024-01-23 DIAGNOSIS — E66.9 OBESITY, UNSPECIFIED: ICD-10-CM

## 2024-01-23 DIAGNOSIS — Z87.59 PERSONAL HISTORY OF OTHER COMPLICATIONS OF PREGNANCY, CHILDBIRTH AND THE PUERPERIUM: ICD-10-CM

## 2024-01-23 DIAGNOSIS — E55.9 VITAMIN D DEFICIENCY, UNSPECIFIED: ICD-10-CM

## 2024-01-23 DIAGNOSIS — E03.9 HYPOTHYROIDISM, UNSPECIFIED: ICD-10-CM

## 2024-01-23 DIAGNOSIS — R87.810 CERVICAL HIGH RISK HUMAN PAPILLOMAVIRUS (HPV) DNA TEST POSITIVE: ICD-10-CM

## 2024-01-23 DIAGNOSIS — Z34.92 ENCOUNTER FOR SUPERVISION OF NORMAL PREGNANCY, UNSPECIFIED, SECOND TRIMESTER: ICD-10-CM

## 2024-01-23 DIAGNOSIS — O09.529 SUPERVISION OF ELDERLY MULTIGRAVIDA, UNSPECIFIED TRIMESTER: ICD-10-CM

## 2024-01-23 LAB
24R-OH-CALCIDIOL SERPL-MCNC: 18.7 NG/ML — LOW (ref 30–80)
BASOPHILS # BLD AUTO: 0.04 K/UL — SIGNIFICANT CHANGE UP (ref 0–0.2)
BASOPHILS NFR BLD AUTO: 0.4 % — SIGNIFICANT CHANGE UP (ref 0–2)
EOSINOPHIL # BLD AUTO: 0.18 K/UL — SIGNIFICANT CHANGE UP (ref 0–0.5)
EOSINOPHIL NFR BLD AUTO: 1.6 % — SIGNIFICANT CHANGE UP (ref 0–6)
GESTATIONAL GTT PNL UR+SERPL: 74 MG/DL — SIGNIFICANT CHANGE UP (ref 70–94)
GLUCOSE 1H P CHAL SERPL-MCNC: 187 MG/DL — HIGH (ref 70–179)
GTT GEST 2H PNL UR+SERPL: 174 MG/DL — HIGH (ref 70–154)
GTT GEST 3H PNL SERPL: 142 MG/DL — HIGH (ref 70–139)
HCT VFR BLD CALC: 32.5 % — LOW (ref 34.5–45)
HGB BLD-MCNC: 10.4 G/DL — LOW (ref 11.5–15.5)
IANC: 8.64 K/UL — HIGH (ref 1.8–7.4)
IMM GRANULOCYTES NFR BLD AUTO: 0.5 % — SIGNIFICANT CHANGE UP (ref 0–0.9)
LYMPHOCYTES # BLD AUTO: 17.7 % — SIGNIFICANT CHANGE UP (ref 13–44)
LYMPHOCYTES # BLD AUTO: 2.01 K/UL — SIGNIFICANT CHANGE UP (ref 1–3.3)
MCHC RBC-ENTMCNC: 26.6 PG — LOW (ref 27–34)
MCHC RBC-ENTMCNC: 32 GM/DL — SIGNIFICANT CHANGE UP (ref 32–36)
MCV RBC AUTO: 83.1 FL — SIGNIFICANT CHANGE UP (ref 80–100)
MONOCYTES # BLD AUTO: 0.41 K/UL — SIGNIFICANT CHANGE UP (ref 0–0.9)
MONOCYTES NFR BLD AUTO: 3.6 % — SIGNIFICANT CHANGE UP (ref 2–14)
NEUTROPHILS # BLD AUTO: 8.64 K/UL — HIGH (ref 1.8–7.4)
NEUTROPHILS NFR BLD AUTO: 76.2 % — SIGNIFICANT CHANGE UP (ref 43–77)
NRBC # BLD: 0 /100 WBCS — SIGNIFICANT CHANGE UP (ref 0–0)
NRBC # FLD: 0 K/UL — SIGNIFICANT CHANGE UP (ref 0–0)
PLATELET # BLD AUTO: 293 K/UL — SIGNIFICANT CHANGE UP (ref 150–400)
RBC # BLD: 3.91 M/UL — SIGNIFICANT CHANGE UP (ref 3.8–5.2)
RBC # FLD: 13.8 % — SIGNIFICANT CHANGE UP (ref 10.3–14.5)
T PALLIDUM AB TITR SER: NEGATIVE — SIGNIFICANT CHANGE UP
T4 FREE SERPL-MCNC: 1 NG/DL — SIGNIFICANT CHANGE UP (ref 0.9–1.8)
TSH SERPL-MCNC: 2.04 UIU/ML — SIGNIFICANT CHANGE UP (ref 0.27–4.2)
WBC # BLD: 11.34 K/UL — HIGH (ref 3.8–10.5)
WBC # FLD AUTO: 11.34 K/UL — HIGH (ref 3.8–10.5)

## 2024-01-24 ENCOUNTER — NON-APPOINTMENT (OUTPATIENT)
Age: 39
End: 2024-01-24

## 2024-01-29 ENCOUNTER — APPOINTMENT (OUTPATIENT)
Dept: MATERNAL FETAL MEDICINE | Facility: HOSPITAL | Age: 39
End: 2024-01-29

## 2024-01-29 ENCOUNTER — APPOINTMENT (OUTPATIENT)
Dept: OBGYN | Facility: HOSPITAL | Age: 39
End: 2024-01-29

## 2024-02-05 ENCOUNTER — APPOINTMENT (OUTPATIENT)
Dept: OBGYN | Facility: HOSPITAL | Age: 39
End: 2024-02-05
Payer: MEDICAID

## 2024-02-05 ENCOUNTER — APPOINTMENT (OUTPATIENT)
Dept: MATERNAL FETAL MEDICINE | Facility: HOSPITAL | Age: 39
End: 2024-02-05
Payer: MEDICAID

## 2024-02-05 ENCOUNTER — ASOB RESULT (OUTPATIENT)
Age: 39
End: 2024-02-05

## 2024-02-05 ENCOUNTER — OUTPATIENT (OUTPATIENT)
Dept: OUTPATIENT SERVICES | Facility: HOSPITAL | Age: 39
LOS: 1 days | End: 2024-02-05

## 2024-02-05 VITALS
HEART RATE: 112 BPM | DIASTOLIC BLOOD PRESSURE: 83 MMHG | HEIGHT: 67 IN | TEMPERATURE: 97.7 F | SYSTOLIC BLOOD PRESSURE: 122 MMHG | WEIGHT: 238 LBS | BODY MASS INDEX: 37.35 KG/M2

## 2024-02-05 VITALS — DIASTOLIC BLOOD PRESSURE: 76 MMHG | SYSTOLIC BLOOD PRESSURE: 118 MMHG

## 2024-02-05 PROCEDURE — 76816 OB US FOLLOW-UP PER FETUS: CPT | Mod: 26

## 2024-02-05 PROCEDURE — 76819 FETAL BIOPHYS PROFIL W/O NST: CPT | Mod: 26,59

## 2024-02-06 DIAGNOSIS — O09.529 SUPERVISION OF ELDERLY MULTIGRAVIDA, UNSPECIFIED TRIMESTER: ICD-10-CM

## 2024-02-06 DIAGNOSIS — E55.9 VITAMIN D DEFICIENCY, UNSPECIFIED: ICD-10-CM

## 2024-02-06 DIAGNOSIS — E66.9 OBESITY, UNSPECIFIED: ICD-10-CM

## 2024-02-06 DIAGNOSIS — E03.9 HYPOTHYROIDISM, UNSPECIFIED: ICD-10-CM

## 2024-02-06 DIAGNOSIS — O24.419 GESTATIONAL DIABETES MELLITUS IN PREGNANCY, UNSPECIFIED CONTROL: ICD-10-CM

## 2024-02-06 DIAGNOSIS — D50.8 OTHER IRON DEFICIENCY ANEMIAS: ICD-10-CM

## 2024-02-15 ENCOUNTER — NON-APPOINTMENT (OUTPATIENT)
Age: 39
End: 2024-02-15

## 2024-02-21 ENCOUNTER — NON-APPOINTMENT (OUTPATIENT)
Age: 39
End: 2024-02-21

## 2024-02-22 ENCOUNTER — OUTPATIENT (OUTPATIENT)
Dept: OUTPATIENT SERVICES | Facility: HOSPITAL | Age: 39
LOS: 1 days | End: 2024-02-22

## 2024-02-22 ENCOUNTER — NON-APPOINTMENT (OUTPATIENT)
Age: 39
End: 2024-02-22

## 2024-02-22 ENCOUNTER — APPOINTMENT (OUTPATIENT)
Dept: OBGYN | Facility: HOSPITAL | Age: 39
End: 2024-02-22

## 2024-02-22 VITALS
DIASTOLIC BLOOD PRESSURE: 82 MMHG | HEART RATE: 118 BPM | BODY MASS INDEX: 36.41 KG/M2 | HEIGHT: 67 IN | SYSTOLIC BLOOD PRESSURE: 120 MMHG | WEIGHT: 232 LBS | TEMPERATURE: 97.9 F

## 2024-02-22 LAB — GLUCOSE BLDC GLUCOMTR-MCNC: 157

## 2024-02-23 DIAGNOSIS — O09.529 SUPERVISION OF ELDERLY MULTIGRAVIDA, UNSPECIFIED TRIMESTER: ICD-10-CM

## 2024-02-23 DIAGNOSIS — O24.410 GESTATIONAL DIABETES MELLITUS IN PREGNANCY, DIET CONTROLLED: ICD-10-CM

## 2024-02-23 DIAGNOSIS — Z87.59 PERSONAL HISTORY OF OTHER COMPLICATIONS OF PREGNANCY, CHILDBIRTH AND THE PUERPERIUM: ICD-10-CM

## 2024-02-23 DIAGNOSIS — E03.9 HYPOTHYROIDISM, UNSPECIFIED: ICD-10-CM

## 2024-03-07 ENCOUNTER — APPOINTMENT (OUTPATIENT)
Dept: OBGYN | Facility: HOSPITAL | Age: 39
End: 2024-03-07
Payer: MEDICAID

## 2024-03-07 ENCOUNTER — APPOINTMENT (OUTPATIENT)
Dept: MATERNAL FETAL MEDICINE | Facility: HOSPITAL | Age: 39
End: 2024-03-07
Payer: MEDICAID

## 2024-03-07 ENCOUNTER — OUTPATIENT (OUTPATIENT)
Dept: OUTPATIENT SERVICES | Facility: HOSPITAL | Age: 39
LOS: 1 days | End: 2024-03-07

## 2024-03-07 ENCOUNTER — ASOB RESULT (OUTPATIENT)
Age: 39
End: 2024-03-07

## 2024-03-07 VITALS
TEMPERATURE: 98 F | BODY MASS INDEX: 38.45 KG/M2 | HEIGHT: 67 IN | SYSTOLIC BLOOD PRESSURE: 123 MMHG | HEART RATE: 108 BPM | WEIGHT: 245 LBS | DIASTOLIC BLOOD PRESSURE: 73 MMHG

## 2024-03-07 PROCEDURE — 76816 OB US FOLLOW-UP PER FETUS: CPT | Mod: 26

## 2024-03-07 PROCEDURE — 76819 FETAL BIOPHYS PROFIL W/O NST: CPT | Mod: 26,59

## 2024-03-07 PROCEDURE — 99213 OFFICE O/P EST LOW 20 MIN: CPT

## 2024-03-08 DIAGNOSIS — O24.419 GESTATIONAL DIABETES MELLITUS IN PREGNANCY, UNSPECIFIED CONTROL: ICD-10-CM

## 2024-03-13 ENCOUNTER — NON-APPOINTMENT (OUTPATIENT)
Age: 39
End: 2024-03-13

## 2024-03-14 ENCOUNTER — APPOINTMENT (OUTPATIENT)
Dept: ANTEPARTUM | Facility: CLINIC | Age: 39
End: 2024-03-14

## 2024-03-14 ENCOUNTER — APPOINTMENT (OUTPATIENT)
Dept: OBGYN | Facility: HOSPITAL | Age: 39
End: 2024-03-14

## 2024-03-20 ENCOUNTER — RESULT CHARGE (OUTPATIENT)
Age: 39
End: 2024-03-20

## 2024-03-20 ENCOUNTER — NON-APPOINTMENT (OUTPATIENT)
Age: 39
End: 2024-03-20

## 2024-03-21 ENCOUNTER — APPOINTMENT (OUTPATIENT)
Dept: ANTEPARTUM | Facility: CLINIC | Age: 39
End: 2024-03-21
Payer: MEDICAID

## 2024-03-21 ENCOUNTER — RESULT REVIEW (OUTPATIENT)
Age: 39
End: 2024-03-21

## 2024-03-21 ENCOUNTER — ASOB RESULT (OUTPATIENT)
Age: 39
End: 2024-03-21

## 2024-03-21 ENCOUNTER — APPOINTMENT (OUTPATIENT)
Dept: OBGYN | Facility: HOSPITAL | Age: 39
End: 2024-03-21
Payer: MEDICAID

## 2024-03-21 ENCOUNTER — OUTPATIENT (OUTPATIENT)
Dept: OUTPATIENT SERVICES | Facility: HOSPITAL | Age: 39
LOS: 1 days | End: 2024-03-21

## 2024-03-21 VITALS
HEIGHT: 67 IN | TEMPERATURE: 98 F | WEIGHT: 246.44 LBS | SYSTOLIC BLOOD PRESSURE: 117 MMHG | DIASTOLIC BLOOD PRESSURE: 76 MMHG | BODY MASS INDEX: 38.68 KG/M2 | HEART RATE: 90 BPM

## 2024-03-21 DIAGNOSIS — E03.9 HYPOTHYROIDISM, UNSPECIFIED: ICD-10-CM

## 2024-03-21 DIAGNOSIS — D50.8 OTHER IRON DEFICIENCY ANEMIAS: ICD-10-CM

## 2024-03-21 DIAGNOSIS — O09.529 SUPERVISION OF ELDERLY MULTIGRAVIDA, UNSPECIFIED TRIMESTER: ICD-10-CM

## 2024-03-21 DIAGNOSIS — E55.9 VITAMIN D DEFICIENCY, UNSPECIFIED: ICD-10-CM

## 2024-03-21 DIAGNOSIS — O24.419 GESTATIONAL DIABETES MELLITUS IN PREGNANCY, UNSPECIFIED CONTROL: ICD-10-CM

## 2024-03-21 DIAGNOSIS — Z87.59 PERSONAL HISTORY OF OTHER COMPLICATIONS OF PREGNANCY, CHILDBIRTH AND THE PUERPERIUM: ICD-10-CM

## 2024-03-21 DIAGNOSIS — E66.9 OBESITY, UNSPECIFIED: ICD-10-CM

## 2024-03-21 LAB
BASOPHILS # BLD AUTO: 0.07 K/UL — SIGNIFICANT CHANGE UP (ref 0–0.2)
BASOPHILS NFR BLD AUTO: 0.5 % — SIGNIFICANT CHANGE UP (ref 0–2)
EOSINOPHIL # BLD AUTO: 0.19 K/UL — SIGNIFICANT CHANGE UP (ref 0–0.5)
EOSINOPHIL NFR BLD AUTO: 1.4 % — SIGNIFICANT CHANGE UP (ref 0–6)
HCT VFR BLD CALC: 33.3 % — LOW (ref 34.5–45)
HGB BLD-MCNC: 10.5 G/DL — LOW (ref 11.5–15.5)
HIV 1+2 AB+HIV1 P24 AG SERPL QL IA: SIGNIFICANT CHANGE UP
IANC: 9.87 K/UL — HIGH (ref 1.8–7.4)
IMM GRANULOCYTES NFR BLD AUTO: 0.7 % — SIGNIFICANT CHANGE UP (ref 0–0.9)
LYMPHOCYTES # BLD AUTO: 19.4 % — SIGNIFICANT CHANGE UP (ref 13–44)
LYMPHOCYTES # BLD AUTO: 2.69 K/UL — SIGNIFICANT CHANGE UP (ref 1–3.3)
MCHC RBC-ENTMCNC: 25.7 PG — LOW (ref 27–34)
MCHC RBC-ENTMCNC: 31.5 GM/DL — LOW (ref 32–36)
MCV RBC AUTO: 81.4 FL — SIGNIFICANT CHANGE UP (ref 80–100)
MONOCYTES # BLD AUTO: 0.94 K/UL — HIGH (ref 0–0.9)
MONOCYTES NFR BLD AUTO: 6.8 % — SIGNIFICANT CHANGE UP (ref 2–14)
NEUTROPHILS # BLD AUTO: 9.87 K/UL — HIGH (ref 1.8–7.4)
NEUTROPHILS NFR BLD AUTO: 71.2 % — SIGNIFICANT CHANGE UP (ref 43–77)
NRBC # BLD: 0 /100 WBCS — SIGNIFICANT CHANGE UP (ref 0–0)
NRBC # FLD: 0 K/UL — SIGNIFICANT CHANGE UP (ref 0–0)
PLATELET # BLD AUTO: 351 K/UL — SIGNIFICANT CHANGE UP (ref 150–400)
RBC # BLD: 4.09 M/UL — SIGNIFICANT CHANGE UP (ref 3.8–5.2)
RBC # FLD: 14.6 % — HIGH (ref 10.3–14.5)
TSH SERPL-MCNC: 2.62 UIU/ML — SIGNIFICANT CHANGE UP (ref 0.27–4.2)
WBC # BLD: 13.86 K/UL — HIGH (ref 3.8–10.5)
WBC # FLD AUTO: 13.86 K/UL — HIGH (ref 3.8–10.5)

## 2024-03-21 PROCEDURE — 76818 FETAL BIOPHYS PROFILE W/NST: CPT

## 2024-03-21 PROCEDURE — 99213 OFFICE O/P EST LOW 20 MIN: CPT

## 2024-03-22 ENCOUNTER — NON-APPOINTMENT (OUTPATIENT)
Age: 39
End: 2024-03-22

## 2024-03-22 LAB
C TRACH RRNA SPEC QL NAA+PROBE: SIGNIFICANT CHANGE UP
GROUP B BETA STREP DNA (PCR): SIGNIFICANT CHANGE UP
N GONORRHOEA RRNA SPEC QL NAA+PROBE: SIGNIFICANT CHANGE UP
SOURCE GROUP B STREP: SIGNIFICANT CHANGE UP
SPECIMEN SOURCE: SIGNIFICANT CHANGE UP

## 2024-03-23 ENCOUNTER — TRANSCRIPTION ENCOUNTER (OUTPATIENT)
Age: 39
End: 2024-03-23

## 2024-03-24 ENCOUNTER — NON-APPOINTMENT (OUTPATIENT)
Age: 39
End: 2024-03-24

## 2024-03-24 ENCOUNTER — INPATIENT (INPATIENT)
Facility: HOSPITAL | Age: 39
LOS: 2 days | Discharge: HOME | End: 2024-03-27
Attending: SPECIALIST | Admitting: SPECIALIST
Payer: MEDICAID

## 2024-03-24 VITALS — SYSTOLIC BLOOD PRESSURE: 134 MMHG | HEART RATE: 92 BPM | DIASTOLIC BLOOD PRESSURE: 74 MMHG

## 2024-03-24 DIAGNOSIS — O24.419 GESTATIONAL DIABETES MELLITUS IN PREGNANCY, UNSPECIFIED CONTROL: ICD-10-CM

## 2024-03-24 LAB
BASOPHILS # BLD AUTO: 0.08 K/UL — SIGNIFICANT CHANGE UP (ref 0–0.2)
BASOPHILS NFR BLD AUTO: 0.5 % — SIGNIFICANT CHANGE UP (ref 0–2)
BLD GP AB SCN SERPL QL: NEGATIVE — SIGNIFICANT CHANGE UP
EOSINOPHIL # BLD AUTO: 0.25 K/UL — SIGNIFICANT CHANGE UP (ref 0–0.5)
EOSINOPHIL NFR BLD AUTO: 1.5 % — SIGNIFICANT CHANGE UP (ref 0–6)
HCT VFR BLD CALC: 33.1 % — LOW (ref 34.5–45)
HGB BLD-MCNC: 10.5 G/DL — LOW (ref 11.5–15.5)
IANC: 12.01 K/UL — HIGH (ref 1.8–7.4)
IMM GRANULOCYTES NFR BLD AUTO: 0.4 % — SIGNIFICANT CHANGE UP (ref 0–0.9)
LYMPHOCYTES # BLD AUTO: 18.9 % — SIGNIFICANT CHANGE UP (ref 13–44)
LYMPHOCYTES # BLD AUTO: 3.13 K/UL — SIGNIFICANT CHANGE UP (ref 1–3.3)
MCHC RBC-ENTMCNC: 25.4 PG — LOW (ref 27–34)
MCHC RBC-ENTMCNC: 31.7 GM/DL — LOW (ref 32–36)
MCV RBC AUTO: 80 FL — SIGNIFICANT CHANGE UP (ref 80–100)
MONOCYTES # BLD AUTO: 0.99 K/UL — HIGH (ref 0–0.9)
MONOCYTES NFR BLD AUTO: 6 % — SIGNIFICANT CHANGE UP (ref 2–14)
NEUTROPHILS # BLD AUTO: 12.01 K/UL — HIGH (ref 1.8–7.4)
NEUTROPHILS NFR BLD AUTO: 72.7 % — SIGNIFICANT CHANGE UP (ref 43–77)
NRBC # BLD: 0 /100 WBCS — SIGNIFICANT CHANGE UP (ref 0–0)
NRBC # FLD: 0 K/UL — SIGNIFICANT CHANGE UP (ref 0–0)
PLATELET # BLD AUTO: 352 K/UL — SIGNIFICANT CHANGE UP (ref 150–400)
RBC # BLD: 4.14 M/UL — SIGNIFICANT CHANGE UP (ref 3.8–5.2)
RBC # FLD: 14.9 % — HIGH (ref 10.3–14.5)
RH IG SCN BLD-IMP: POSITIVE — SIGNIFICANT CHANGE UP
RH IG SCN BLD-IMP: POSITIVE — SIGNIFICANT CHANGE UP
WBC # BLD: 16.53 K/UL — HIGH (ref 3.8–10.5)
WBC # FLD AUTO: 16.53 K/UL — HIGH (ref 3.8–10.5)

## 2024-03-24 PROCEDURE — 12345F: CUSTOM | Mod: NC

## 2024-03-24 RX ORDER — INFLUENZA VIRUS VACCINE 15; 15; 15; 15 UG/.5ML; UG/.5ML; UG/.5ML; UG/.5ML
0.5 SUSPENSION INTRAMUSCULAR ONCE
Refills: 0 | Status: DISCONTINUED | OUTPATIENT
Start: 2024-03-24 | End: 2024-03-27

## 2024-03-24 RX ORDER — SODIUM CHLORIDE 9 MG/ML
1000 INJECTION INTRAMUSCULAR; INTRAVENOUS; SUBCUTANEOUS
Refills: 0 | Status: DISCONTINUED | OUTPATIENT
Start: 2024-03-24 | End: 2024-03-24

## 2024-03-24 RX ORDER — DIPHENHYDRAMINE HCL 50 MG
25 CAPSULE ORAL EVERY 4 HOURS
Refills: 0 | Status: DISCONTINUED | OUTPATIENT
Start: 2024-03-24 | End: 2024-03-27

## 2024-03-24 RX ORDER — CHLORHEXIDINE GLUCONATE 213 G/1000ML
1 SOLUTION TOPICAL DAILY
Refills: 0 | Status: DISCONTINUED | OUTPATIENT
Start: 2024-03-24 | End: 2024-03-24

## 2024-03-24 RX ORDER — ONDANSETRON 8 MG/1
4 TABLET, FILM COATED ORAL EVERY 6 HOURS
Refills: 0 | Status: DISCONTINUED | OUTPATIENT
Start: 2024-03-24 | End: 2024-03-27

## 2024-03-24 RX ORDER — SODIUM CHLORIDE 9 MG/ML
500 INJECTION, SOLUTION INTRAVENOUS ONCE
Refills: 0 | Status: DISCONTINUED | OUTPATIENT
Start: 2024-03-24 | End: 2024-03-24

## 2024-03-24 RX ORDER — MORPHINE SULFATE 50 MG/1
2 CAPSULE, EXTENDED RELEASE ORAL ONCE
Refills: 0 | Status: DISCONTINUED | OUTPATIENT
Start: 2024-03-24 | End: 2024-03-27

## 2024-03-24 RX ORDER — NALOXONE HYDROCHLORIDE 4 MG/.1ML
0.1 SPRAY NASAL
Refills: 0 | Status: DISCONTINUED | OUTPATIENT
Start: 2024-03-24 | End: 2024-03-27

## 2024-03-24 RX ORDER — OXYCODONE HYDROCHLORIDE 5 MG/1
10 TABLET ORAL
Refills: 0 | Status: DISCONTINUED | OUTPATIENT
Start: 2024-03-24 | End: 2024-03-24

## 2024-03-24 RX ORDER — DIPHENHYDRAMINE HCL 50 MG
25 CAPSULE ORAL ONCE
Refills: 0 | Status: COMPLETED | OUTPATIENT
Start: 2024-03-24 | End: 2024-03-24

## 2024-03-24 RX ORDER — BUTORPHANOL TARTRATE 2 MG/ML
0.25 INJECTION, SOLUTION INTRAMUSCULAR; INTRAVENOUS EVERY 6 HOURS
Refills: 0 | Status: DISCONTINUED | OUTPATIENT
Start: 2024-03-24 | End: 2024-03-24

## 2024-03-24 RX ORDER — SODIUM CHLORIDE 9 MG/ML
1000 INJECTION, SOLUTION INTRAVENOUS
Refills: 0 | Status: DISCONTINUED | OUTPATIENT
Start: 2024-03-24 | End: 2024-03-24

## 2024-03-24 RX ORDER — OXYTOCIN 10 UNIT/ML
VIAL (ML) INJECTION
Qty: 30 | Refills: 0 | Status: DISCONTINUED | OUTPATIENT
Start: 2024-03-24 | End: 2024-03-24

## 2024-03-24 RX ORDER — DEXAMETHASONE 0.5 MG/5ML
4 ELIXIR ORAL EVERY 6 HOURS
Refills: 0 | Status: DISCONTINUED | OUTPATIENT
Start: 2024-03-24 | End: 2024-03-27

## 2024-03-24 RX ORDER — SODIUM CHLORIDE 9 MG/ML
300 INJECTION INTRAMUSCULAR; INTRAVENOUS; SUBCUTANEOUS ONCE
Refills: 0 | Status: COMPLETED | OUTPATIENT
Start: 2024-03-24 | End: 2024-03-24

## 2024-03-24 RX ORDER — SODIUM CHLORIDE 9 MG/ML
300 INJECTION INTRAMUSCULAR; INTRAVENOUS; SUBCUTANEOUS ONCE
Refills: 0 | Status: DISCONTINUED | OUTPATIENT
Start: 2024-03-24 | End: 2024-03-24

## 2024-03-24 RX ORDER — OXYCODONE HYDROCHLORIDE 5 MG/1
5 TABLET ORAL
Refills: 0 | Status: DISCONTINUED | OUTPATIENT
Start: 2024-03-24 | End: 2024-03-24

## 2024-03-24 RX ORDER — OXYTOCIN 10 UNIT/ML
333.33 VIAL (ML) INJECTION
Qty: 20 | Refills: 0 | Status: DISCONTINUED | OUTPATIENT
Start: 2024-03-24 | End: 2024-03-26

## 2024-03-24 RX ORDER — CITRIC ACID/SODIUM CITRATE 300-500 MG
15 SOLUTION, ORAL ORAL EVERY 6 HOURS
Refills: 0 | Status: DISCONTINUED | OUTPATIENT
Start: 2024-03-24 | End: 2024-03-24

## 2024-03-24 RX ORDER — LEVOTHYROXINE SODIUM 125 MCG
50 TABLET ORAL DAILY
Refills: 0 | Status: DISCONTINUED | OUTPATIENT
Start: 2024-03-24 | End: 2024-03-24

## 2024-03-24 RX ADMIN — CHLORHEXIDINE GLUCONATE 1 APPLICATION(S): 213 SOLUTION TOPICAL at 02:24

## 2024-03-24 RX ADMIN — Medication 50 MICROGRAM(S): at 06:31

## 2024-03-24 RX ADMIN — CHLORHEXIDINE GLUCONATE 1 APPLICATION(S): 213 SOLUTION TOPICAL at 19:41

## 2024-03-24 RX ADMIN — SODIUM CHLORIDE 125 MILLILITER(S): 9 INJECTION, SOLUTION INTRAVENOUS at 02:24

## 2024-03-24 RX ADMIN — Medication 2 MILLIUNIT(S)/MIN: at 17:08

## 2024-03-24 RX ADMIN — FAMOTIDINE 20 MILLIGRAM(S): 10 INJECTION INTRAVENOUS at 22:41

## 2024-03-24 RX ADMIN — SODIUM CHLORIDE 125 MILLILITER(S): 9 INJECTION, SOLUTION INTRAVENOUS at 18:00

## 2024-03-24 RX ADMIN — Medication 25 MILLIGRAM(S): at 21:57

## 2024-03-24 RX ADMIN — SODIUM CHLORIDE 600 MILLILITER(S): 9 INJECTION INTRAMUSCULAR; INTRAVENOUS; SUBCUTANEOUS at 20:28

## 2024-03-24 NOTE — CHART NOTE - NSCHARTNOTEFT_GEN_A_CORE
R4 Chart Note     Patient now with Cat 2 FHR tracing >2hr despite many resuscitative measures (amnioinfusion, IVF bolus, frequent repositioning, pitocin off). Decels persistent, now late, and variability decreasing.   Discussed with patient that given persistent Cat 2 FHR remote from delivery, our recommendation would be to proceed with primary  section due to concerns for fetal intolerance to labor. Patient in agreement.   Patient counseled on r/b/a of C/S including bleeding, infection, damage to surrounding structures in pelvis. All questions answered.   Consents signed.   Charge RN and Anesthesia alerted, OR being prepped for C/S.     Jessa Nguyễn MD PGY4   Dr. Ashton at bedside

## 2024-03-24 NOTE — OB RN PATIENT PROFILE - FUNCTIONAL ASSESSMENT - BASIC MOBILITY 1.
Pain Refusal Text: I offered to prescribe pain medication but the patient refused to take this medication. 4 = No assist / stand by assistance

## 2024-03-24 NOTE — OB PROVIDER H&P - HISTORY OF PRESENT ILLNESS
R1 Admission H&P    Subjective  HPI: 38y  @ 38w1 presents for IOL for GDMA2. Patient irregularly taking Novolin 30U. FS 100s fasting last week. irregular contractions    +FM. -LOF.  -VB. Pt denies any other concerns.    – PNC: GDMA2, AMA, sciatic pain bilaterally L>R requiring assistance for ambulation. GBS neg. EFW 3400g extrapolated from prior sono  – OBHx:   G1  FT 7#3 c/b preadmission for PP PEC   G2 TOP w/ medication  – GynHx: denies fibroids, cysts, endometriosis, abnormal pap smears, STIs  – PMH: hypothyroid  – PSH: denies  – Psych: denies   – Social: denies   – Meds: PNV, PO iron, Novolin 30u qhs (irregular), synthroid 50mcg, ASA 182mg (irregular)   – Allergies: NKDA  – Will accept blood transfusions? Yes   R1 Admission H&P    Subjective  HPI: 38y  @ 38w1 presents for IOL for poorly controlled GDMA2 with fetopathy (AC 95%). Patient irregularly taking Novolin 30U. FS 100s fasting last week. irregular contractions    +FM. -LOF.  -VB. Pt denies any other concerns.    Per MFM, given fetus is showing signs of fetopathy and uncontrolled GDM -- delivery plan for 38-39wks    ATU SONO on 3/6  EFW 2925   g      6 lb 7 oz      69   %    HC:      308.8  mm     G.Age:   34w 3d          4  %   AC:      337.1  mm     G.Age:   37w 4d        95   %    – PNC: GDMA2, AMA, sciatic pain bilaterally L>R requiring assistance for ambulation. GBS neg. EFW 3400g extrapolated from prior sono  – OBHx:   G1  FT 7#3 c/b preadmission for PP PEC   G2 TOP w/ medication  – GynHx: denies fibroids, cysts, endometriosis, abnormal pap smears, STIs  – PMH: hypothyroid  – PSH: denies  – Psych: denies   – Social: denies   – Meds: PNV, PO iron, Novolin 30u qhs (irregular), synthroid 50mcg, ASA 182mg (irregular)   – Allergies: NKDA  – Will accept blood transfusions? Yes

## 2024-03-24 NOTE — OB PROVIDER H&P - NS_TRIAGEMEMBRANE_OBGYN_ALL_OB
Continue same medications    Keep all you scheduled appointments to see your PCP and cardiologist    Remember to call the cardiologist office if you are going to the dentist.  You will need an antibiotic to take prior to your dental exam/cleaning/procedure.      Call with any questions or concerns       Intact

## 2024-03-24 NOTE — OB PROVIDER H&P - PATIENT'S SEXUAL ORIENTATION
Heterosexual Gabapentin Counseling: I discussed with the patient the risks of gabapentin including but not limited to dizziness, somnolence, fatigue and ataxia.

## 2024-03-24 NOTE — OB PROVIDER LABOR PROGRESS NOTE - NS_SUBJECTIVE/OBJECTIVE_OBGYN_ALL_OB_FT
PGY1 Labor & Delivery Progress Note     Pt seen & examined for AROM in the setting of variable decels    SVE: 3/70/-3  EFM: 130/mod. variability/+accels/+variable decels  [place]: q2-6 min    T(C): 36.6 (03-24-24 @ 19:31), Max: 36.9 (03-24-24 @ 01:38)  HR: 78 (03-24-24 @ 20:11) (69 - 96)  BP: 118/62 (03-24-24 @ 19:58) (103/59 - 141/70)  RR: 18 (03-24-24 @ 19:31) (16 - 19)  SpO2: 100% (03-24-24 @ 20:11) (91% - 100%)
s/p CB

## 2024-03-24 NOTE — OB PROVIDER LABOR PROGRESS NOTE - ASSESSMENT
- AROM, clear fluid, pt tolerated well   - IUPC placed, consider AI if variables continue  - Cont EFM, toco   - Anticipate      D/w Dr. Brody Parham PGY-1   Nguyễn PGY-4 at bedside

## 2024-03-24 NOTE — OB PROVIDER IHI INDUCTION/AUGMENTATION NOTE - NS_CHECKALL_OBGYN_ALL_OB
Order was written/H&P was completed/Contractions pattern was reviewed/FHR was reviewed/Induction / Augmentation was discussed Skyrizi Counseling: I discussed with the patient the risks of risankizumab-rzaa including but not limited to immunosuppression, and serious infections.  The patient understands that monitoring is required including a PPD at baseline and must alert us or the primary physician if symptoms of infection or other concerning signs are noted.

## 2024-03-24 NOTE — OB PROVIDER H&P - NSLOWPPHRISK_OBGYN_A_OB
No previous uterine incision/Quinones Pregnancy/Less than or equal to 4 previous vaginal births/No known bleeding disorder/No history of postpartum hemorrhage

## 2024-03-24 NOTE — OB RN PATIENT PROFILE - PRO VDRL INFANT
[Initial Visit] : an initial visit for [Foot Pain] : foot pain [FreeTextEntry2] : 5th met base b/l (R worse than L) negative

## 2024-03-24 NOTE — OB PROVIDER H&P - NSHPPHYSICALEXAM_GEN_ALL_CORE
Objective  – VS  T(C): 36.9 (03-24-24 @ 02:08)  HR: 92 (03-24-24 @ 02:08)  BP: 134/74 (03-24-24 @ 02:08)  RR: 16 (03-24-24 @ 02:08)    Physical Exam  Pulm: breathing comfortably on RA  Abd: gravid, nontender  Extr: moving all extremities with ease  – FS: 90  – Spec: pooling, nitrazine, ferning, bleeding,  (lesions if patient with HSV2 history)  – VE: 0/0/-3  – FHT: baseline 125, mod variability, +accels, -decels  – Cylinder: irregular   – EFW: 3400g extrapolated from prior sono  – Sono: vertex

## 2024-03-24 NOTE — OB PROVIDER H&P - ASSESSMENT
Assessment  38y   @ 38w1 presents for IOL for GDMA2    Plan  1. Admit to L+D. Routine Labs. IVF.  2. IOL w/ buccal cytotec, cervical balloon.  3. Fetus: cat 1 tracing. VTX. EFW 3400g extrapolated from prior sono. Continuous EFM. Sono. No concerns.  4. Prenatal issues: GDMA2, AMA, hx PP PEC, hypothyroid   5. GBS (-)  6. Pain: IV pain meds/epidural PRN    D/w Dr. Braeden Laird, PGY-1 Assessment  38y   @ 38w1 presents for IOL for GDMA2. Per antepartum records, per MFM patient to be induced at 38w due to uncontrolled GDMA2.    Plan  1. Admit to L+D. Routine Labs. IVF.  2. IOL w/ buccal cytotec, cervical balloon.  3. Fetus: cat 1 tracing. VTX. EFW 3400g extrapolated from prior sono. Continuous EFM. Sono. No concerns.  4. Prenatal issues: GDMA2, AMA, hx PP PEC, hypothyroid   5. GBS (-)  6. Pain: IV pain meds/epidural PRN    D/w Dr. Braeden Laird, PGY-1

## 2024-03-25 LAB
ALBUMIN SERPL ELPH-MCNC: 2.4 G/DL — LOW (ref 3.3–5)
ALP SERPL-CCNC: 188 U/L — HIGH (ref 40–120)
ALT FLD-CCNC: 18 U/L — SIGNIFICANT CHANGE UP (ref 4–33)
ANION GAP SERPL CALC-SCNC: 8 MMOL/L — SIGNIFICANT CHANGE UP (ref 7–14)
APPEARANCE UR: ABNORMAL
APTT BLD: 30 SEC — SIGNIFICANT CHANGE UP (ref 24.5–35.6)
AST SERPL-CCNC: 24 U/L — SIGNIFICANT CHANGE UP (ref 4–32)
BACTERIA # UR AUTO: NEGATIVE /HPF — SIGNIFICANT CHANGE UP
BILIRUB SERPL-MCNC: 0.4 MG/DL — SIGNIFICANT CHANGE UP (ref 0.2–1.2)
BILIRUB UR-MCNC: NEGATIVE — SIGNIFICANT CHANGE UP
BUN SERPL-MCNC: 7 MG/DL — SIGNIFICANT CHANGE UP (ref 7–23)
CALCIUM SERPL-MCNC: 8.3 MG/DL — LOW (ref 8.4–10.5)
CAST: 8 /LPF — HIGH (ref 0–4)
CHLORIDE SERPL-SCNC: 106 MMOL/L — SIGNIFICANT CHANGE UP (ref 98–107)
CO2 SERPL-SCNC: 18 MMOL/L — LOW (ref 22–31)
COD CRY URNS QL: PRESENT
COLOR SPEC: YELLOW — SIGNIFICANT CHANGE UP
CREAT ?TM UR-MCNC: 111 MG/DL — SIGNIFICANT CHANGE UP
CREAT SERPL-MCNC: 0.55 MG/DL — SIGNIFICANT CHANGE UP (ref 0.5–1.3)
DIFF PNL FLD: ABNORMAL
EGFR: 119 ML/MIN/1.73M2 — SIGNIFICANT CHANGE UP
FIBRINOGEN PPP-MCNC: 426 MG/DL — SIGNIFICANT CHANGE UP (ref 200–465)
GLUCOSE SERPL-MCNC: 146 MG/DL — HIGH (ref 70–99)
GLUCOSE UR QL: NEGATIVE MG/DL — SIGNIFICANT CHANGE UP
HCT VFR BLD CALC: 31.1 % — LOW (ref 34.5–45)
HGB BLD-MCNC: 9.8 G/DL — LOW (ref 11.5–15.5)
HYALINE CASTS # UR AUTO: PRESENT
INR BLD: 0.96 RATIO — SIGNIFICANT CHANGE UP (ref 0.85–1.18)
KETONES UR-MCNC: NEGATIVE MG/DL — SIGNIFICANT CHANGE UP
LDH SERPL L TO P-CCNC: 226 U/L — HIGH (ref 135–225)
LEUKOCYTE ESTERASE UR-ACNC: ABNORMAL
MCHC RBC-ENTMCNC: 25.5 PG — LOW (ref 27–34)
MCHC RBC-ENTMCNC: 31.5 GM/DL — LOW (ref 32–36)
MCV RBC AUTO: 80.8 FL — SIGNIFICANT CHANGE UP (ref 80–100)
NITRITE UR-MCNC: NEGATIVE — SIGNIFICANT CHANGE UP
NRBC # BLD: 0 /100 WBCS — SIGNIFICANT CHANGE UP (ref 0–0)
NRBC # FLD: 0 K/UL — SIGNIFICANT CHANGE UP (ref 0–0)
PH UR: 6.5 — SIGNIFICANT CHANGE UP (ref 5–8)
PLATELET # BLD AUTO: 267 K/UL — SIGNIFICANT CHANGE UP (ref 150–400)
POTASSIUM SERPL-MCNC: 4 MMOL/L — SIGNIFICANT CHANGE UP (ref 3.5–5.3)
POTASSIUM SERPL-SCNC: 4 MMOL/L — SIGNIFICANT CHANGE UP (ref 3.5–5.3)
PROT ?TM UR-MCNC: 46 MG/DL — SIGNIFICANT CHANGE UP
PROT SERPL-MCNC: 5.6 G/DL — LOW (ref 6–8.3)
PROT UR-MCNC: 30 MG/DL
PROT/CREAT UR-RTO: 0.4 RATIO — HIGH (ref 0–0.2)
PROTHROM AB SERPL-ACNC: 10.9 SEC — SIGNIFICANT CHANGE UP (ref 9.5–13)
RBC # BLD: 3.85 M/UL — SIGNIFICANT CHANGE UP (ref 3.8–5.2)
RBC # FLD: 15.1 % — HIGH (ref 10.3–14.5)
RBC CASTS # UR COMP ASSIST: 132 /HPF — HIGH (ref 0–4)
REVIEW: SIGNIFICANT CHANGE UP
SODIUM SERPL-SCNC: 132 MMOL/L — LOW (ref 135–145)
SP GR SPEC: 1.01 — SIGNIFICANT CHANGE UP (ref 1–1.03)
SQUAMOUS # UR AUTO: 4 /HPF — SIGNIFICANT CHANGE UP (ref 0–5)
URATE SERPL-MCNC: 4.4 MG/DL — SIGNIFICANT CHANGE UP (ref 2.5–7)
UROBILINOGEN FLD QL: 1 MG/DL — SIGNIFICANT CHANGE UP (ref 0.2–1)
WBC # BLD: 20.48 K/UL — HIGH (ref 3.8–10.5)
WBC # FLD AUTO: 20.48 K/UL — HIGH (ref 3.8–10.5)
WBC UR QL: 18 /HPF — HIGH (ref 0–5)

## 2024-03-25 RX ORDER — FAMOTIDINE 10 MG/ML
20 INJECTION INTRAVENOUS ONCE
Refills: 0 | Status: COMPLETED | OUTPATIENT
Start: 2024-03-25 | End: 2024-03-24

## 2024-03-25 RX ORDER — HEPARIN SODIUM 5000 [USP'U]/ML
5000 INJECTION INTRAVENOUS; SUBCUTANEOUS EVERY 12 HOURS
Refills: 0 | Status: DISCONTINUED | OUTPATIENT
Start: 2024-03-25 | End: 2024-03-27

## 2024-03-25 RX ORDER — LEVOTHYROXINE SODIUM 125 MCG
50 TABLET ORAL DAILY
Refills: 0 | Status: DISCONTINUED | OUTPATIENT
Start: 2024-03-25 | End: 2024-03-27

## 2024-03-25 RX ORDER — TETANUS TOXOID, REDUCED DIPHTHERIA TOXOID AND ACELLULAR PERTUSSIS VACCINE, ADSORBED 5; 2.5; 8; 8; 2.5 [IU]/.5ML; [IU]/.5ML; UG/.5ML; UG/.5ML; UG/.5ML
0.5 SUSPENSION INTRAMUSCULAR ONCE
Refills: 0 | Status: DISCONTINUED | OUTPATIENT
Start: 2024-03-25 | End: 2024-03-27

## 2024-03-25 RX ORDER — SODIUM CHLORIDE 9 MG/ML
1000 INJECTION, SOLUTION INTRAVENOUS
Refills: 0 | Status: DISCONTINUED | OUTPATIENT
Start: 2024-03-25 | End: 2024-03-27

## 2024-03-25 RX ORDER — LANOLIN
1 OINTMENT (GRAM) TOPICAL EVERY 6 HOURS
Refills: 0 | Status: DISCONTINUED | OUTPATIENT
Start: 2024-03-25 | End: 2024-03-27

## 2024-03-25 RX ORDER — SIMETHICONE 80 MG/1
80 TABLET, CHEWABLE ORAL EVERY 4 HOURS
Refills: 0 | Status: DISCONTINUED | OUTPATIENT
Start: 2024-03-25 | End: 2024-03-27

## 2024-03-25 RX ORDER — OXYTOCIN 10 UNIT/ML
333.33 VIAL (ML) INJECTION
Qty: 20 | Refills: 0 | Status: DISCONTINUED | OUTPATIENT
Start: 2024-03-25 | End: 2024-03-26

## 2024-03-25 RX ORDER — ACETAMINOPHEN 500 MG
975 TABLET ORAL
Refills: 0 | Status: DISCONTINUED | OUTPATIENT
Start: 2024-03-25 | End: 2024-03-27

## 2024-03-25 RX ORDER — SODIUM CHLORIDE 9 MG/ML
1000 INJECTION, SOLUTION INTRAVENOUS
Refills: 0 | Status: DISCONTINUED | OUTPATIENT
Start: 2024-03-25 | End: 2024-03-25

## 2024-03-25 RX ORDER — IBUPROFEN 200 MG
600 TABLET ORAL EVERY 6 HOURS
Refills: 0 | Status: COMPLETED | OUTPATIENT
Start: 2024-03-25 | End: 2025-02-21

## 2024-03-25 RX ORDER — MAGNESIUM HYDROXIDE 400 MG/1
30 TABLET, CHEWABLE ORAL
Refills: 0 | Status: DISCONTINUED | OUTPATIENT
Start: 2024-03-25 | End: 2024-03-27

## 2024-03-25 RX ORDER — DIPHENHYDRAMINE HCL 50 MG
25 CAPSULE ORAL EVERY 6 HOURS
Refills: 0 | Status: DISCONTINUED | OUTPATIENT
Start: 2024-03-25 | End: 2024-03-27

## 2024-03-25 RX ORDER — OXYCODONE HYDROCHLORIDE 5 MG/1
5 TABLET ORAL ONCE
Refills: 0 | Status: DISCONTINUED | OUTPATIENT
Start: 2024-03-25 | End: 2024-03-27

## 2024-03-25 RX ORDER — KETOROLAC TROMETHAMINE 30 MG/ML
30 SYRINGE (ML) INJECTION EVERY 6 HOURS
Refills: 0 | Status: DISCONTINUED | OUTPATIENT
Start: 2024-03-25 | End: 2024-03-26

## 2024-03-25 RX ORDER — OXYCODONE HYDROCHLORIDE 5 MG/1
5 TABLET ORAL
Refills: 0 | Status: COMPLETED | OUTPATIENT
Start: 2024-03-25 | End: 2024-04-01

## 2024-03-25 RX ADMIN — Medication 30 MILLIGRAM(S): at 14:47

## 2024-03-25 RX ADMIN — Medication 30 MILLIGRAM(S): at 08:27

## 2024-03-25 RX ADMIN — Medication 25 MILLIGRAM(S): at 14:47

## 2024-03-25 RX ADMIN — Medication 50 MICROGRAM(S): at 08:27

## 2024-03-25 RX ADMIN — HEPARIN SODIUM 5000 UNIT(S): 5000 INJECTION INTRAVENOUS; SUBCUTANEOUS at 18:05

## 2024-03-25 RX ADMIN — Medication 30 MILLIGRAM(S): at 15:30

## 2024-03-25 RX ADMIN — HEPARIN SODIUM 5000 UNIT(S): 5000 INJECTION INTRAVENOUS; SUBCUTANEOUS at 06:40

## 2024-03-25 RX ADMIN — Medication 30 MILLIGRAM(S): at 09:15

## 2024-03-25 RX ADMIN — SIMETHICONE 80 MILLIGRAM(S): 80 TABLET, CHEWABLE ORAL at 18:07

## 2024-03-25 RX ADMIN — Medication 30 MILLIGRAM(S): at 21:20

## 2024-03-25 RX ADMIN — Medication 30 MILLIGRAM(S): at 20:43

## 2024-03-25 NOTE — OB RN DELIVERY SUMMARY - NSSKINTOSKINA_OBGYN_ALL_OB_DIFF_HHMM
Addended by: CT SAINI on: 11/2/2023 08:12 AM     Modules accepted: Orders     1 Hour(s) 39 Minute(s)

## 2024-03-25 NOTE — OB NEONATOLOGY/PEDIATRICIAN DELIVERY SUMMARY - NSPEDSNEONOTESA_OBGYN_ALL_OB_FT
Pediatrician called to delivery for cat II tracings. Female infant born at 31+1/7 wks via  to a 39 y/o  blood type O+ mother. Maternal history of hypothyroidism (on synthroid), anemia, HPV. Prenatal history of GDMA2. Prenatal labs nr/immune/-, GBS - on 3/21. SROM at 2008 on 3/24 with clear fluids. EOS score 0.09, highest maternal temperature 36.9. Baby emerged vigorous, crying. Infant was brought to radiant warmer and warmed, dried, stimulated and suctioned. HR>100, normal respiratory effort. APGARS of 8/9. Mom is initiating breast feeding and formula feeding. Consents to Hepatitis B vaccination. Pediatrician is Dr. Mahajan.    BW: 2980g  : 3/24/24  TOB: 2324    Physical Exam:  Gen: no acute distress, +grimace  HEENT:  anterior fontanel open soft and flat, nondysmorphic facies, no cleft lip/palate, ears normal set, no ear pits or tags, nares clinically patent  Resp: Normal respiratory effort without grunting or retractions, good air entry b/l, clear to auscultation bilaterally  Cardio: Present S1/S2, regular rate and rhythm  Abd: soft, non tender, non distended, umbilical cord with 3 vessels  Neuro: +palmar and plantar grasp, +suck, +gerald, normal tone  Extremities: negative keenan and ortolani maneuvers, moving all extremities, no clavicular crepitus or stepoff  Skin: pink, warm  Genitals: Normal female anatomy, Chandrakant 1, anus patent

## 2024-03-25 NOTE — OB RN INTRAOPERATIVE NOTE - NSSELHIDDEN_OBGYN_ALL_OB_FT
[NS_DeliveryAttending1_OBGYN_ALL_OB_FT:[id number]],[NS_DeliveryAssist1_OBGYN_ALL_OB_FT:[id number]],[NS_DeliveryRN_OBGYN_ALL_OB_FT:[id number]=]

## 2024-03-25 NOTE — OB RN DELIVERY SUMMARY - NS_LABORCHARACTER_OBGYN_ALL_OB
Induction of labor-AROM/Induction of labor-Medicinal/Augmentation of labor/Internal electronic FM/External electronic FM/Fetal intolerance

## 2024-03-25 NOTE — OB RN DELIVERY SUMMARY - NS_SEPSISRSKCALC_OBGYN_ALL_OB_FT
EOS calculated successfully. EOS Risk Factor: 0.5/1000 live births ([facility] national incidence); GA=38w1d; Temp=98.42; ROM=3.267; GBS='Negative'; Antibiotics='No antibiotics or any antibiotics < 2 hrs prior to birth'

## 2024-03-25 NOTE — OB PROVIDER DELIVERY SUMMARY - NSSELHIDDEN_OBGYN_ALL_OB_FT
[NS_DeliveryAttending1_OBGYN_ALL_OB_FT:[id number]],[NS_DeliveryAssist1_OBGYN_ALL_OB_FT:[id number]]

## 2024-03-25 NOTE — OB PROVIDER DELIVERY SUMMARY - NSPROVIDERDELIVERYNOTE_OBGYN_ALL_OB_FT
pLTCS for Cat 2 FHR (persistent variable and late decels >2h with intermittently minimal variability)  Viable F infant, apgars 8/9, weight 2980g delivered from vertex presentation with cord noted to be lying next to head in lower segment  Hysterotomy closed in 1 layer using vicryl  Grossly normal uterus, tubes, and ovaries  Abdomen closed in standard fashion  Pt and infant to recovery in stable condition    QBL:304   IVF:1300    UOP:20    Jessa Nguyễn MD PGY4   w/ Dr. Ashton

## 2024-03-25 NOTE — OB RN DELIVERY SUMMARY - NSSELHIDDEN_OBGYN_ALL_OB_FT
[NS_DeliveryAttending1_OBGYN_ALL_OB_FT:[id number]],[NS_DeliveryAssist1_OBGYN_ALL_OB_FT:[id number]],[NS_DeliveryRN_OBGYN_ALL_OB_FT:[id number]=]

## 2024-03-25 NOTE — CHART NOTE - NSCHARTNOTEFT_GEN_A_CORE
R1 Chart Note    Upon review of maternal BPs, pt meets criteria for gHTN due to elevated BPs >4 hours apart.    Patient seen and examined at bedside, denies CP, SOB, N/V, HA, blurred vision, RUQ pain.    Vital Signs Last 24 Hours  T(C): 36.6 (24 @ 09:00), Max: 36.6 (24 @ 19:31)  HR: 72 (24 @ 09:00) (64 - 120)  BP: 122/67 (24 @ 09:00) (89/65 - 185/88)  RR: 18 (24 @ 09:00) (12 - 24)  SpO2: 99% (24 @ 09:00) (80% - 100%)      Labs:                 9.8    20.48 )-----------( 267      (  @ 03:10 )             31.1                10.5   16.53 )-----------( 352      (  @ 01:40 )             33.1                10.5   13.86 )-----------( 351      (  @ 14:22 )             33.3         132<L>  |  106  |  7   ----------------------------<  146<H>  4.0   |  18<L>  |  0.55    Ca    8.3<L>      25 Mar 2024 06:20    TPro  5.6<L>  /  Alb  2.4<L>  /  TBili  0.4  /  DBili  x   /  AST  24  /  ALT  18  /  AlkPhos  188<H>        A/P:  POD#1 from [facility] for cat 2 now meeting criteria for gHTN.    #gHTN  - BPs on postpartum have been wnl  - denies severe features   - baseline HELLP labs wnl, P/C pending (drawn from [facility])   - no antihypertensives on board  - continue to monitor closely  - Diagnosis discussed with patient and she expressed understanding. Will rx BP cuff to [facility] for patient to  before discharge.    L Panella PGY1    ***** INCOMPLETE ***** R1 Chart Note    Upon review of maternal BPs, pt meets criteria for PEC due to elevated BPs >4 hours apart and P/C 0.4 from [facility].    Patient seen and examined at bedside, denies CP, SOB, N/V, HA, blurred vision, RUQ pain.    Vital Signs Last 24 Hours  T(C): 36.6 (24 @ 09:00), Max: 36.6 (24 @ 19:31)  HR: 72 (24 @ 09:00) (64 - 120)  BP: 122/67 (24 @ 09:00) (89/65 - 185/88)  RR: 18 (24 @ 09:00) (12 - 24)  SpO2: 99% (24 @ 09:00) (80% - 100%)      Labs:                 9.8    20.48 )-----------( 267      (  @ 03:10 )             31.1                10.5   16.53 )-----------( 352      (  @ 01:40 )             33.1                10.5   13.86 )-----------( 351      (  @ 14:22 )             33.3         132<L>  |  106  |  7   ----------------------------<  146<H>  4.0   |  18<L>  |  0.55    Ca    8.3<L>      25 Mar 2024 06:20    TPro  5.6<L>  /  Alb  2.4<L>  /  TBili  0.4  /  DBili  x   /  AST  24  /  ALT  18  /  AlkPhos  188<H>  -      A/P:  POD#1 from [facility] for cat 2 now meeting criteria for PEC.    #PEC w/o SF  - BPs on postpartum have been wnl  - denies severe features   - baseline HELLP labs wnl, P/C 0.4 (drawn from [facility])   - no antihypertensives on board  - continue to monitor closely  - Diagnosis discussed with patient and she expressed understanding. She has a BP cuff at home already as she had preeclampsia in her prior pregnancy. All questions answered.    L Panella PGY1

## 2024-03-25 NOTE — PROGRESS NOTE ADULT - ASSESSMENT
39y/o  POD#1 from pLTCS for cat 2 complicated by gHTN and low UOP. . Overall, patient stable and recovering well postoperatively.    #gHTN  - Upon review of BPs, patient meets criteria for gHTN due to elevated BPs >4 hours apart  - Will draw P/C from patient's [facility]  - BPs overnight in PACU wnl  - denies severe features   - baseline HELLP labs wnl, P/C pending; repeat prn   - no antihypertensives on board  - continue to monitor   - Will discuss diagnosis with the patient and write chart note    #Low UOP  - decreased UOP in the PACU  - s/p multiple boluses  - CBC drawn in PACU, H/H stable  - As of 7AM,  for the hour which is adequate for pt's weight    #Postpartum state  - Hct: 33.1->31.1  - Continue with po analgesia  - Increase ambulation  - Continue regular diet  - DVT prophylaxis with 5000u Heparin    Mattie Estrada  PGY-1

## 2024-03-26 ENCOUNTER — APPOINTMENT (OUTPATIENT)
Dept: ANTEPARTUM | Facility: CLINIC | Age: 39
End: 2024-03-26

## 2024-03-26 ENCOUNTER — TRANSCRIPTION ENCOUNTER (OUTPATIENT)
Age: 39
End: 2024-03-26

## 2024-03-26 DIAGNOSIS — O14.90 UNSPECIFIED PRE-ECLAMPSIA, UNSPECIFIED TRIMESTER: ICD-10-CM

## 2024-03-26 LAB
BASOPHILS # BLD AUTO: 0.05 K/UL — SIGNIFICANT CHANGE UP (ref 0–0.2)
BASOPHILS NFR BLD AUTO: 0.3 % — SIGNIFICANT CHANGE UP (ref 0–2)
EOSINOPHIL # BLD AUTO: 0.3 K/UL — SIGNIFICANT CHANGE UP (ref 0–0.5)
EOSINOPHIL NFR BLD AUTO: 1.9 % — SIGNIFICANT CHANGE UP (ref 0–6)
HCT VFR BLD CALC: 28.7 % — LOW (ref 34.5–45)
HGB BLD-MCNC: 8.8 G/DL — LOW (ref 11.5–15.5)
IANC: 11.3 K/UL — HIGH (ref 1.8–7.4)
IMM GRANULOCYTES NFR BLD AUTO: 0.5 % — SIGNIFICANT CHANGE UP (ref 0–0.9)
LYMPHOCYTES # BLD AUTO: 17.7 % — SIGNIFICANT CHANGE UP (ref 13–44)
LYMPHOCYTES # BLD AUTO: 2.74 K/UL — SIGNIFICANT CHANGE UP (ref 1–3.3)
MCHC RBC-ENTMCNC: 24.9 PG — LOW (ref 27–34)
MCHC RBC-ENTMCNC: 30.7 GM/DL — LOW (ref 32–36)
MCV RBC AUTO: 81.1 FL — SIGNIFICANT CHANGE UP (ref 80–100)
MONOCYTES # BLD AUTO: 1 K/UL — HIGH (ref 0–0.9)
MONOCYTES NFR BLD AUTO: 6.5 % — SIGNIFICANT CHANGE UP (ref 2–14)
NEUTROPHILS # BLD AUTO: 11.3 K/UL — HIGH (ref 1.8–7.4)
NEUTROPHILS NFR BLD AUTO: 73.1 % — SIGNIFICANT CHANGE UP (ref 43–77)
NRBC # BLD: 0 /100 WBCS — SIGNIFICANT CHANGE UP (ref 0–0)
NRBC # FLD: 0 K/UL — SIGNIFICANT CHANGE UP (ref 0–0)
PLATELET # BLD AUTO: 225 K/UL — SIGNIFICANT CHANGE UP (ref 150–400)
RBC # BLD: 3.54 M/UL — LOW (ref 3.8–5.2)
RBC # FLD: 15.5 % — HIGH (ref 10.3–14.5)
T PALLIDUM AB TITR SER: NEGATIVE — SIGNIFICANT CHANGE UP
WBC # BLD: 15.47 K/UL — HIGH (ref 3.8–10.5)
WBC # FLD AUTO: 15.47 K/UL — HIGH (ref 3.8–10.5)

## 2024-03-26 RX ORDER — IBUPROFEN 200 MG
600 TABLET ORAL EVERY 6 HOURS
Refills: 0 | Status: DISCONTINUED | OUTPATIENT
Start: 2024-03-26 | End: 2024-03-27

## 2024-03-26 RX ORDER — NORETHINDRONE 0.35 MG/1
1 TABLET ORAL
Qty: 28 | Refills: 11
Start: 2024-03-26 | End: 2025-02-24

## 2024-03-26 RX ORDER — HUMAN INSULIN 100 [IU]/ML
30 INJECTION, SUSPENSION SUBCUTANEOUS
Refills: 0 | DISCHARGE

## 2024-03-26 RX ORDER — IBUPROFEN 200 MG
1 TABLET ORAL
Qty: 0 | Refills: 0 | DISCHARGE
Start: 2024-03-26

## 2024-03-26 RX ORDER — ACETAMINOPHEN 500 MG
3 TABLET ORAL
Qty: 0 | Refills: 0 | DISCHARGE
Start: 2024-03-26

## 2024-03-26 RX ORDER — LEVOTHYROXINE SODIUM 125 MCG
1 TABLET ORAL
Qty: 0 | Refills: 0 | DISCHARGE
Start: 2024-03-26

## 2024-03-26 RX ORDER — OXYCODONE HYDROCHLORIDE 5 MG/1
5 TABLET ORAL
Refills: 0 | Status: DISCONTINUED | OUTPATIENT
Start: 2024-03-26 | End: 2024-03-27

## 2024-03-26 RX ADMIN — Medication 975 MILLIGRAM(S): at 01:00

## 2024-03-26 RX ADMIN — Medication 30 MILLIGRAM(S): at 04:15

## 2024-03-26 RX ADMIN — OXYCODONE HYDROCHLORIDE 5 MILLIGRAM(S): 5 TABLET ORAL at 23:36

## 2024-03-26 RX ADMIN — Medication 975 MILLIGRAM(S): at 00:22

## 2024-03-26 RX ADMIN — Medication 600 MILLIGRAM(S): at 13:00

## 2024-03-26 RX ADMIN — Medication 975 MILLIGRAM(S): at 20:50

## 2024-03-26 RX ADMIN — Medication 975 MILLIGRAM(S): at 06:40

## 2024-03-26 RX ADMIN — HEPARIN SODIUM 5000 UNIT(S): 5000 INJECTION INTRAVENOUS; SUBCUTANEOUS at 06:08

## 2024-03-26 RX ADMIN — HEPARIN SODIUM 5000 UNIT(S): 5000 INJECTION INTRAVENOUS; SUBCUTANEOUS at 16:17

## 2024-03-26 RX ADMIN — Medication 600 MILLIGRAM(S): at 18:00

## 2024-03-26 RX ADMIN — SIMETHICONE 80 MILLIGRAM(S): 80 TABLET, CHEWABLE ORAL at 16:19

## 2024-03-26 RX ADMIN — Medication 975 MILLIGRAM(S): at 16:48

## 2024-03-26 RX ADMIN — Medication 975 MILLIGRAM(S): at 06:08

## 2024-03-26 RX ADMIN — Medication 30 MILLIGRAM(S): at 03:43

## 2024-03-26 RX ADMIN — Medication 600 MILLIGRAM(S): at 18:30

## 2024-03-26 RX ADMIN — Medication 975 MILLIGRAM(S): at 16:12

## 2024-03-26 RX ADMIN — Medication 50 MICROGRAM(S): at 06:08

## 2024-03-26 RX ADMIN — Medication 600 MILLIGRAM(S): at 12:18

## 2024-03-26 RX ADMIN — Medication 975 MILLIGRAM(S): at 20:17

## 2024-03-26 NOTE — DISCHARGE NOTE OB - PATIENT PORTAL LINK FT
You can access the FollowMyHealth Patient Portal offered by [facility] by registering at the following website: [email or web address]. By joining [facility]’s FollowMyHealth portal, you will also be able to view your health information using other applications (apps) compatible with our system.

## 2024-03-26 NOTE — DISCHARGE NOTE OB - MEDICATION SUMMARY - MEDICATIONS TO STOP TAKING
I will STOP taking the medications listed below when I get home from the hospital:    NovoLIN N 100 units/mL subcutaneous suspension  -- 30 unit(s) subcutaneous once a day (at bedtime)

## 2024-03-26 NOTE — PROGRESS NOTE ADULT - ASSESSMENT
39y/o  POD#2 from pLTCS for cat 2 complicated by PEC. . Overall, patient stable and recovering well postoperatively.    #PEC  - Met criteria by two BPs >4 hours apart and P/C 0.4  - BPs overnight wnl  - denies severe features   - baseline HELLP labs wnl; repeat prn   - no antihypertensives on board  - continue to monitor     #Postpartum state  - Hct: 33.1->31.1  - Continue with po analgesia  - Increase ambulation  - Continue regular diet  - DVT prophylaxis with 5000u Heparin    Mattie Estrada  PGY-1

## 2024-03-26 NOTE — DISCHARGE NOTE OB - HOSPITAL COURSE
Hospital Course:  Patient was admitted for IOL for GDMA2 and underwent an uncomplicated pLTCS for cat 2 tracing (please see operative note for details of procedure.). While on L&D she also met criteria for PEC w/o severe features by moderate range BPs and an elevated P/C ratio.  EBL: 304  Hct: 33.1->31    Patient's post operative course was unremarkable and she remained hemodynamically stable and afebrile throughout. Her HELLP labs remained normal. Upon discharge on POD ***, the patient is ambulating and voiding spontaneously, tolerated oral intake, pain was well controlled with oral medications and vital signs were stable. She will use POPs for postpartum contraception. Hospital Course:  Patient was admitted for IOL for GDMA2 and underwent an uncomplicated pLTCS for cat 2 tracing (please see operative note for details of procedure.). While on L&D she also met criteria for PEC w/o severe features by moderate range BPs and an elevated P/C ratio.  EBL: 304  Hct: 33.1->31->28.9    Patient's post operative course was unremarkable and she remained hemodynamically stable and afebrile throughout. Her HELLP labs remained normal. Upon discharge on POD 2, the patient is ambulating and voiding spontaneously, tolerated oral intake, pain was well controlled with oral medications and vital signs were stable. She will use POPs for postpartum contraception. She will follow up in the clinic in 2 days for a blood pressure check and was instructed to taking her BPs at home 2-3 times per day and writing down all of the values to bring with her to clinic.

## 2024-03-26 NOTE — DISCHARGE NOTE OB - ADDITIONAL INSTRUCTIONS
Please follow up in 2-3 days for a blood pressure check, in 2 weeks for an incision check, and in 6 weeks for a postpartum appointment at the Ambulatory Clinic Unit, [facility], [facility] Building, basement floor. Please call the office for an appointment ([phone])    NS  Please f/u in 2 weeks for an incision check and for a postpartum appointment in 4-6 weeks @ the [facility] located at [address] [place] [address], 2nd floor, [place], [place], [address]. Please call the office for an appointment ([phone]).

## 2024-03-26 NOTE — DISCHARGE NOTE OB - MEDICATION SUMMARY - MEDICATIONS TO TAKE
I will START or STAY ON the medications listed below when I get home from the hospital:    Blood pressure cuff  -- Please take your blood pressure 2-3 times  per day  -- Indication: For Preeclampsia    ibuprofen 600 mg oral tablet  -- 1 tab(s) by mouth every 6 hours  -- Indication: For Pain    acetaminophen 325 mg oral tablet  -- 3 tab(s) by mouth every 6 hours  -- Indication: For Pain    norethindrone 0.35 mg oral tablet  -- 1 tab(s) by mouth once a day Please take one pill at the same time every day for birth control MDD: 1 tablet  -- Indication: For Birth control    levothyroxine 50 mcg (0.05 mg) oral tablet  -- 1 tab(s) by mouth once a day  -- Indication: For Hypothyroidism   I will START or STAY ON the medications listed below when I get home from the hospital:    Blood pressure cuff  -- Please take your blood pressure 2-3 times  per day  -- Indication: For Preeclampsia    ibuprofen 600 mg oral tablet  -- 1 tab(s) by mouth every 6 hours  -- Indication: For Pain    acetaminophen 325 mg oral tablet  -- 3 tab(s) by mouth every 6 hours  -- Indication: For Pain    oxyCODONE 5 mg oral tablet  -- 1 tab(s) by mouth every 6 hours as needed for  severe pain MDD: 4 tablets  -- Indication: For severe pain    norethindrone 0.35 mg oral tablet  -- 1 tab(s) by mouth once a day Please take one pill at the same time every day for birth control MDD: 1 tablet  -- Indication: For Birth control    levothyroxine 50 mcg (0.05 mg) oral tablet  -- 1 tab(s) by mouth once a day  -- Indication: For Hypothyroidism

## 2024-03-26 NOTE — DISCHARGE NOTE OB - CARE PLAN
Principal Discharge DX:	 (normal spontaneous vaginal delivery)  Assessment and plan of treatment:	Make your follow-up appointment with your doctor as ordered.   No heavy lifting, driving, or strenuous activity for 6 weeks.  Nothing per vagina such as tampons, intercourse, douches or tub baths for 6 weeks or until you see your doctor.  Call your doctor if you're unable to tolerate food, increase in vaginal bleeding, or have difficulty urinating. Call your doctor if you have pain that is not relieved by medications. Notify your doctor with any other concerns.  Secondary Diagnosis:	Preeclampsia  Assessment and plan of treatment:	While you were in labor, you had some elevated blood pressures and met criteria for preeclampsia. A prescription was sent for a blood pressure cuff to monitor your blood pressures at home. Call your doctor if your blood pressure is greater than or equal to 140 systolic (top number) or 90 diastolic (bottom number) or if you experience a headache unrelieved by over the counter medications, blurred vision, or difficulty breathing.    Please follow up in the clinic in 2-3 days for a blood pressure check.  Secondary Diagnosis:	Gestational diabetes mellitus, class A2  Assessment and plan of treatment:	You had gestational diabetes during the pregnancy. You no longer need to take your blood sugar or take insulin. Please follow up in the clinic in 6 weeks for a repeat sugar test.   1 Principal Discharge DX:	 delivery delivered  Assessment and plan of treatment:	Make your follow-up appointment with your doctor as ordered.   No heavy lifting, driving, or strenuous activity for 6 weeks.   Nothing per vagina such as tampons, intercourse, douches or tub baths for 6 weeks or until you see your doctor.   Call your doctor with any signs and symptoms of infection such as fever, chills, nausea or vomiting. Call your doctor with redness or swelling at the incision site, fluid leakage or wound separation. Call your doctor if you're unable to tolerate food, increase in vaginal bleeding, or have difficulty urinating. Call your doctor if you have pain that is not relieved by your prescribed medications. Notify your doctor with any of concerns.  Secondary Diagnosis:	Preeclampsia  Assessment and plan of treatment:	While you were in labor, you had some elevated blood pressures and met criteria for preeclampsia. A prescription was sent for a blood pressure cuff to monitor your blood pressures at home. Call your doctor if your blood pressure is greater than or equal to 140 systolic (top number) or 90 diastolic (bottom number) or if you experience a headache unrelieved by over the counter medications, blurred vision, or difficulty breathing.    Please follow up in the clinic in 2-3 days for a blood pressure check.  Secondary Diagnosis:	Gestational diabetes mellitus, class A2  Assessment and plan of treatment:	You had gestational diabetes during the pregnancy. You no longer need to take your blood sugar or take insulin. Please follow up in the clinic in 6 weeks for a repeat sugar test.

## 2024-03-26 NOTE — DISCHARGE NOTE OB - PROVIDER TOKENS
FREE:[LAST:[Ambulatory Clinic Unit],PHONE:[phone]],FAX:[(   )    -],ADDRESS:[[facility]  [address] [place] [address]  Oncology Building, basement floor  *** Please call the office for an appointment],FOLLOWUP:[1-3 days]]

## 2024-03-26 NOTE — DISCHARGE NOTE OB - NS MD DC FALL RISK RISK
For information on Fall & Injury Prevention, visit: [email or web address] OR  [email or web address] OR  https://www.cdc.gov/steadi/patient.html

## 2024-03-26 NOTE — DISCHARGE NOTE OB - CARE PROVIDER_API CALL
Ambulatory Clinic Unit,   [facility]  [address] [place] [id number]  Oncology Building, basement floor  *** Please call the office for an appointment  Phone: [phone]  Fax: (   )    -  Follow Up Time: 1-3 days

## 2024-03-26 NOTE — DISCHARGE NOTE OB - PLAN OF CARE
Make your follow-up appointment with your doctor as ordered.   No heavy lifting, driving, or strenuous activity for 6 weeks.  Nothing per vagina such as tampons, intercourse, douches or tub baths for 6 weeks or until you see your doctor.  Call your doctor if you're unable to tolerate food, increase in vaginal bleeding, or have difficulty urinating. Call your doctor if you have pain that is not relieved by medications. Notify your doctor with any other concerns. While you were in labor, you had some elevated blood pressures and met criteria for preeclampsia. A prescription was sent for a blood pressure cuff to monitor your blood pressures at home. Call your doctor if your blood pressure is greater than or equal to 140 systolic (top number) or 90 diastolic (bottom number) or if you experience a headache unrelieved by over the counter medications, blurred vision, or difficulty breathing.    Please follow up in the clinic in 2-3 days for a blood pressure check. You had gestational diabetes during the pregnancy. You no longer need to take your blood sugar or take insulin. Please follow up in the clinic in 6 weeks for a repeat sugar test. Make your follow-up appointment with your doctor as ordered.   No heavy lifting, driving, or strenuous activity for 6 weeks.   Nothing per vagina such as tampons, intercourse, douches or tub baths for 6 weeks or until you see your doctor.   Call your doctor with any signs and symptoms of infection such as fever, chills, nausea or vomiting. Call your doctor with redness or swelling at the incision site, fluid leakage or wound separation. Call your doctor if you're unable to tolerate food, increase in vaginal bleeding, or have difficulty urinating. Call your doctor if you have pain that is not relieved by your prescribed medications. Notify your doctor with any of concerns.

## 2024-03-27 VITALS
DIASTOLIC BLOOD PRESSURE: 76 MMHG | RESPIRATION RATE: 18 BRPM | HEART RATE: 81 BPM | OXYGEN SATURATION: 100 % | SYSTOLIC BLOOD PRESSURE: 124 MMHG | TEMPERATURE: 98 F

## 2024-03-27 RX ORDER — OXYCODONE HYDROCHLORIDE 5 MG/1
1 TABLET ORAL
Qty: 5 | Refills: 0
Start: 2024-03-27

## 2024-03-27 RX ADMIN — Medication 975 MILLIGRAM(S): at 03:18

## 2024-03-27 RX ADMIN — OXYCODONE HYDROCHLORIDE 5 MILLIGRAM(S): 5 TABLET ORAL at 00:10

## 2024-03-27 RX ADMIN — Medication 600 MILLIGRAM(S): at 13:07

## 2024-03-27 RX ADMIN — Medication 975 MILLIGRAM(S): at 03:50

## 2024-03-27 RX ADMIN — Medication 600 MILLIGRAM(S): at 07:15

## 2024-03-27 RX ADMIN — Medication 50 MICROGRAM(S): at 06:39

## 2024-03-27 RX ADMIN — Medication 975 MILLIGRAM(S): at 15:51

## 2024-03-27 RX ADMIN — Medication 600 MILLIGRAM(S): at 06:39

## 2024-03-27 RX ADMIN — HEPARIN SODIUM 5000 UNIT(S): 5000 INJECTION INTRAVENOUS; SUBCUTANEOUS at 06:39

## 2024-03-27 RX ADMIN — Medication 600 MILLIGRAM(S): at 00:21

## 2024-03-27 RX ADMIN — Medication 600 MILLIGRAM(S): at 13:40

## 2024-03-27 RX ADMIN — Medication 975 MILLIGRAM(S): at 09:52

## 2024-03-27 RX ADMIN — Medication 600 MILLIGRAM(S): at 01:10

## 2024-03-27 RX ADMIN — Medication 975 MILLIGRAM(S): at 09:13

## 2024-03-27 NOTE — PROGRESS NOTE ADULT - SUBJECTIVE AND OBJECTIVE BOX
INTERVAL HPI/OVERNIGHT EVENTS:  38y Female s/p c section under anesthesia with duramorph for post op analgesia      Vital Signs Last 24 Hrs  T(C): 36.6 (25 Mar 2024 09:00), Max: 36.6 (24 Mar 2024 19:31)  T(F): 97.9 (25 Mar 2024 09:00), Max: 97.9 (24 Mar 2024 19:31)  HR: 72 (25 Mar 2024 09:00) (64 - 120)  BP: 122/67 (25 Mar 2024 09:00) (89/65 - 185/88)  BP(mean): 83 (25 Mar 2024 08:00) (74 - 104)  RR: 18 (25 Mar 2024 09:00) (12 - 24)  SpO2: 99% (25 Mar 2024 09:00) (80% - 100%)    Patient's overall anesthesia satisfaction: Positive    Patients pain is well controlled with duramorph    No respiratory events overnight    No pruritis at this time    Patient doing well     No headache      No residual numbness or weakness, sensory and motor function intact.    No anesthetic complications or complaints noted or reported          .            
R1 Progress Note    Patient seen and examined at bedside in PACU, no acute overnight events. No acute complaints, pain well controlled. Patient is not yet ambulating but is tolerating regular diet. Has not yet passed flatus. Garcia is still in place. Denies CP, SOB, N/V, HA, blurred vision. Bleeding minimal.    Vital Signs Last 24 Hours  T(C): 36.5 (03-25-24 @ 00:15), Max: 36.6 (03-24-24 @ 19:31)  HR: 71 (03-25-24 @ 08:00) (64 - 120)  BP: 112/79 (03-25-24 @ 08:00) (89/65 - 185/88)  RR: 18 (03-25-24 @ 08:00) (12 - 24)  SpO2: 98% (03-25-24 @ 08:00) (80% - 100%)    I&O's Summary    24 Mar 2024 07:01  -  25 Mar 2024 07:00  --------------------------------------------------------  IN: 3950 mL / OUT: 1548 mL / NET: 2402 mL        Physical Exam:  General: NAD  Abdomen: Soft, non-tender, non-distended, fundus firm  Incision: Pfannenstiel incision CDI, subcuticular suture closure  Pelvic: Lochia wnl    Labs:    Blood Type: O Positive  Antibody Screen: --               9.8    20.48 )-----------( 267      ( 03-25 @ 03:10 )             31.1                10.5   16.53 )-----------( 352      ( 03-24 @ 01:40 )             33.1                10.5   13.86 )-----------( 351      ( 03-21 @ 14:22 )             33.3         MEDICATIONS  (STANDING):  acetaminophen     Tablet .. 975 milliGRAM(s) Oral <User Schedule>  diphtheria/tetanus/pertussis (acellular) Vaccine (Adacel) 0.5 milliLiter(s) IntraMuscular once  heparin   Injectable 5000 Unit(s) SubCutaneous every 12 hours  ibuprofen  Tablet. 600 milliGRAM(s) Oral every 6 hours  influenza   Vaccine 0.5 milliLiter(s) IntraMuscular once  ketorolac   Injectable 30 milliGRAM(s) IV Push every 6 hours  lactated ringers. 1000 milliLiter(s) (125 mL/Hr) IV Continuous <Continuous>  lactated ringers. 1000 milliLiter(s) (75 mL/Hr) IV Continuous <Continuous>  levothyroxine 50 MICROGram(s) Oral daily  morphine PF Epidural 2 milliGRAM(s) Epidural once  oxytocin Infusion 333.333 milliUNIT(s)/Min (1000 mL/Hr) IV Continuous <Continuous>  oxytocin Infusion 333.333 milliUNIT(s)/Min (1000 mL/Hr) IV Continuous <Continuous>    MEDICATIONS  (PRN):  butorphanol Injectable 0.25 milliGRAM(s) IV Push every 6 hours PRN Pruritus  dexAMETHasone  Injectable 4 milliGRAM(s) IV Push every 6 hours PRN Nausea  diphenhydrAMINE 25 milliGRAM(s) Oral every 6 hours PRN Pruritus  diphenhydrAMINE Injectable 25 milliGRAM(s) IV Push every 4 hours PRN Pruritus  lanolin Ointment 1 Application(s) Topical every 6 hours PRN Sore Nipples  magnesium hydroxide Suspension 30 milliLiter(s) Oral two times a day PRN Constipation  naloxone Injectable 0.1 milliGRAM(s) IV Push every 3 minutes PRN For ANY of the following changes in patient status:  A. Breaths Per Minute LESS THAN 10, B. Oxygen saturation LESS THAN 90%, C. Sedation score of 6 for Stop After: 4 Times  ondansetron Injectable 4 milliGRAM(s) IV Push every 6 hours PRN Nausea  oxyCODONE    IR 5 milliGRAM(s) Oral every 3 hours PRN Mild Pain (1 - 3)  oxyCODONE    IR 10 milliGRAM(s) Oral every 3 hours PRN Moderate Pain (4 - 6)  oxyCODONE    IR 5 milliGRAM(s) Oral every 3 hours PRN Moderate to Severe Pain (4-10)  oxyCODONE    IR 5 milliGRAM(s) Oral once PRN Moderate to Severe Pain (4-10)  simethicone 80 milliGRAM(s) Chew every 4 hours PRN Gas  
R1 Progress Note    Patient seen and examined at bedside, no acute overnight events. No acute complaints, pain well controlled. Patient is ambulating and tolerating regular diet. Has passed flatus. Patient voiding independently. Denies CP, SOB, N/V, HA, blurred vision. Bleeding minimal.    Vital Signs Last 24 Hours  T(C): 36.5 (03-27-24 @ 05:58), Max: 36.7 (03-26-24 @ 16:47)  HR: 72 (03-27-24 @ 05:58) (72 - 81)  BP: 110/74 (03-27-24 @ 05:58) (110/74 - 130/79)  RR: 18 (03-27-24 @ 05:58) (17 - 18)  SpO2: 100% (03-27-24 @ 05:58) (99% - 100%)    I&O's Summary      Physical Exam:  General: NAD  Abdomen: Soft, non-tender, non-distended, fundus firm  Incision: Pfannenstiel incision CDI, subcuticular suture closure  Pelvic: Lochia wnl    Labs:    Blood Type: O Positive  Antibody Screen: --  RPR: Negative               8.8    15.47 )-----------( 225      ( 03-26 @ 06:00 )             28.7                9.8    20.48 )-----------( 267      ( 03-25 @ 03:10 )             31.1                10.5   16.53 )-----------( 352      ( 03-24 @ 01:40 )             33.1         MEDICATIONS  (STANDING):  acetaminophen     Tablet .. 975 milliGRAM(s) Oral <User Schedule>  diphtheria/tetanus/pertussis (acellular) Vaccine (Adacel) 0.5 milliLiter(s) IntraMuscular once  heparin   Injectable 5000 Unit(s) SubCutaneous every 12 hours  ibuprofen  Tablet. 600 milliGRAM(s) Oral every 6 hours  influenza   Vaccine 0.5 milliLiter(s) IntraMuscular once  lactated ringers. 1000 milliLiter(s) (75 mL/Hr) IV Continuous <Continuous>  levothyroxine 50 MICROGram(s) Oral daily  morphine PF Epidural 2 milliGRAM(s) Epidural once    MEDICATIONS  (PRN):  dexAMETHasone  Injectable 4 milliGRAM(s) IV Push every 6 hours PRN Nausea  diphenhydrAMINE 25 milliGRAM(s) Oral every 6 hours PRN Pruritus  diphenhydrAMINE Injectable 25 milliGRAM(s) IV Push every 4 hours PRN Pruritus  lanolin Ointment 1 Application(s) Topical every 6 hours PRN Sore Nipples  magnesium hydroxide Suspension 30 milliLiter(s) Oral two times a day PRN Constipation  naloxone Injectable 0.1 milliGRAM(s) IV Push every 3 minutes PRN For ANY of the following changes in patient status:  A. Breaths Per Minute LESS THAN 10, B. Oxygen saturation LESS THAN 90%, C. Sedation score of 6 for Stop After: 4 Times  ondansetron Injectable 4 milliGRAM(s) IV Push every 6 hours PRN Nausea  oxyCODONE    IR 5 milliGRAM(s) Oral every 3 hours PRN Moderate to Severe Pain (4-10)  oxyCODONE    IR 5 milliGRAM(s) Oral once PRN Moderate to Severe Pain (4-10)  simethicone 80 milliGRAM(s) Chew every 4 hours PRN Gas  
R1 Progress Note    Patient seen and examined at bedside, no acute overnight events. No acute complaints, pain well controlled. Patient is ambulating and tolerating regular diet. Has passed flatus. Patient voiding independently. Denies CP, SOB, N/V, HA, blurred vision. Bleeding minimal.    Vital Signs Last 24 Hours  T(C): 36.7 (03-26-24 @ 06:50), Max: 36.7 (03-25-24 @ 13:52)  HR: 75 (03-26-24 @ 06:50) (68 - 88)  BP: 113/72 (03-26-24 @ 06:50) (98/57 - 122/67)  RR: 18 (03-26-24 @ 06:50) (15 - 18)  SpO2: 100% (03-26-24 @ 06:50) (97% - 100%)    I&O's Summary    25 Mar 2024 07:01  -  26 Mar 2024 07:00  --------------------------------------------------------  IN: 0 mL / OUT: 1375 mL / NET: -1375 mL        Physical Exam:  General: NAD  Abdomen: Soft, non-tender, non-distended, fundus firm  Incision: Pfannenstiel incision CDI, subcuticular suture closure  Pelvic: Lochia wnl    Labs:    Blood Type: O Positive  Antibody Screen: --               9.8    20.48 )-----------( 267      ( 03-25 @ 03:10 )             31.1                10.5   16.53 )-----------( 352      ( 03-24 @ 01:40 )             33.1                10.5   13.86 )-----------( 351      ( 03-21 @ 14:22 )             33.3         MEDICATIONS  (STANDING):  acetaminophen     Tablet .. 975 milliGRAM(s) Oral <User Schedule>  diphtheria/tetanus/pertussis (acellular) Vaccine (Adacel) 0.5 milliLiter(s) IntraMuscular once  heparin   Injectable 5000 Unit(s) SubCutaneous every 12 hours  ibuprofen  Tablet. 600 milliGRAM(s) Oral every 6 hours  influenza   Vaccine 0.5 milliLiter(s) IntraMuscular once  lactated ringers. 1000 milliLiter(s) (75 mL/Hr) IV Continuous <Continuous>  levothyroxine 50 MICROGram(s) Oral daily  morphine PF Epidural 2 milliGRAM(s) Epidural once    MEDICATIONS  (PRN):  dexAMETHasone  Injectable 4 milliGRAM(s) IV Push every 6 hours PRN Nausea  diphenhydrAMINE 25 milliGRAM(s) Oral every 6 hours PRN Pruritus  diphenhydrAMINE Injectable 25 milliGRAM(s) IV Push every 4 hours PRN Pruritus  lanolin Ointment 1 Application(s) Topical every 6 hours PRN Sore Nipples  magnesium hydroxide Suspension 30 milliLiter(s) Oral two times a day PRN Constipation  naloxone Injectable 0.1 milliGRAM(s) IV Push every 3 minutes PRN For ANY of the following changes in patient status:  A. Breaths Per Minute LESS THAN 10, B. Oxygen saturation LESS THAN 90%, C. Sedation score of 6 for Stop After: 4 Times  ondansetron Injectable 4 milliGRAM(s) IV Push every 6 hours PRN Nausea  oxyCODONE    IR 5 milliGRAM(s) Oral every 3 hours PRN Moderate to Severe Pain (4-10)  oxyCODONE    IR 5 milliGRAM(s) Oral once PRN Moderate to Severe Pain (4-10)  simethicone 80 milliGRAM(s) Chew every 4 hours PRN Gas

## 2024-03-27 NOTE — PROGRESS NOTE ADULT - ASSESSMENT
37y/o  POD#3 from pLTCS for cat 2 complicated by PEC. . Overall, patient stable and recovering well postoperatively.    #PEC  - Met criteria by two BPs >4 hours apart and P/C 0.4  - BPs overnight wnl  - denies severe features   - baseline HELLP labs wnl; repeat prn   - no antihypertensives on board  - continue to monitor     #Postpartum state  - Hct: 33.1->31.1->28.7  - Continue with po analgesia  - Increase ambulation  - Continue regular diet  - DVT prophylaxis with 5000u Heparin    Mattie Estrada  PGY-1 37y/o  POD#3 from pLTCS for cat 2 complicated by PEC. . Overall, patient stable and recovering well postoperatively.    #PEC  - Met criteria by two BPs >4 hours apart and P/C 0.4  - BPs overnight wnl  - denies severe features   - baseline HELLP labs wnl; repeat prn   - no antihypertensives on board  - continue to monitor     #Postpartum state  - Hct: 33.1->31.1->28.7  - Continue with po analgesia  - Increase ambulation  - Continue regular diet  - DVT prophylaxis with 5000u Heparin  - POPs for contraception    Mattie Estrada  PGY-1

## 2024-03-27 NOTE — PROGRESS NOTE ADULT - ATTENDING COMMENTS
Associate Chief of L & D ( late entry)    I have met this patient for the first time today.  She was admitted by Dr Deras and delivered by Dr Astorga    OB Progress Note:  Delivery, POD#1    S: 37yo POD#1 s/p LTCS . Her pain is well controlled. She is tolerating a regular diet and passing flatus. Denies N/V. Denies CP/SOB/lightheadedness/dizziness.   She is ambulating without difficulty.   Voiding spontaneously.     O:   Vital Signs Last 24 Hrs  T(C): 36.6 (25 Mar 2024 09:00), Max: 36.6 (24 Mar 2024 19:31)  T(F): 97.9 (25 Mar 2024 09:00), Max: 97.9 (24 Mar 2024 19:31)  HR: 72 (25 Mar 2024 09:00) (64 - 120)  BP: 122/67 (25 Mar 2024 09:00) (89/65 - 185/88)  BP(mean): 83 (25 Mar 2024 08:00) (74 - 104)  RR: 18 (25 Mar 2024 09:00) (12 - 24)  SpO2: 99% (25 Mar 2024 09:00) (80% - 100%)        Labs:  Blood type: O Positive  Rubella IgG: RPR: Negative                          9.8<L>   20.48<H> >-----------< 267    (  @ 03:10 )             31.1<L>                        10.5<L>   16.53<H> >-----------< 352    (  @ 01:40 )             33.1<L>    24 @ 06:20      132<L>  |  106  |  7   ----------------------------<  146<H>  4.0   |  18<L>  |  0.55        Ca    8.3<L>      25 Mar 2024 06:20    TPro  5.6<L>  /  Alb  2.4<L>  /  TBili  0.4  /  DBili  x   /  AST  24  /  ALT  18  /  AlkPhos  188<H>  24 @ 06:20      PE:    Abdomen: Mildly distended, mild uterine  tenderness  incision c/d/i.  Extremities: No erythema,+1  edema    A/P: 37yo POD#1 s/p LTCS. due to CAT II poorly controlled GDMA2, leukocytosis  and GHTN  - Continue regular diet.  - Increase ambulation.  - Continue motrin, tylenol, oxycodone PRN for pain control.  vs. continue PCEA for pain.  - F/u AM CBC  - Repeat CBC in the am to trend leukocytosis  - Monitor BP"s closely    Gisell Bowman M.D., M.B.A., M.S.
Associate Chief of L & D ( late entry)    OB Progress Note:  Delivery, POD#2    S: 39yo POD#2 s/p LTCS .  patient is complaining of abdominal pain     O:   Vital Signs Last 24 Hrs  T(C): 36.4 (26 Mar 2024 09:46), Max: 36.7 (26 Mar 2024 06:50)  T(F): 97.5 (26 Mar 2024 09:46), Max: 98 (26 Mar 2024 06:50)  HR: 81 (26 Mar 2024 09:46) (75 - 88)  BP: 125/78 (26 Mar 2024 09:46) (98/57 - 125/78)  RR: 18 (26 Mar 2024 09:46) (17 - 18)  SpO2: 100% (26 Mar 2024 09:46) (99% - 100%)    Parameters below as of 26 Mar 2024 06:50  Patient On (Oxygen Delivery Method): room air      Labs:  Blood type: O Positive  Rubella IgG: RPR: Negative  LABS:                        8.8    15.47 )-----------( 225      ( 26 Mar 2024 06:00 )             28.7                           9.8<L>   20.48<H> >-----------< 267    (  @ 03:10 )             31.1<L>                        10.5<L>   16.53<H> >-----------< 352    (  @ 01:40 )             33.1<L>    24 @ 06:20      132<L>  |  106  |  7   ----------------------------<  146<H>  4.0   |  18<L>  |  0.55        Ca    8.3<L>      25 Mar 2024 06:20    TPro  5.6<L>  /  Alb  2.4<L>  /  TBili  0.4  /  DBili  x   /  AST  24  /  ALT  18  /  AlkPhos  188<H>  24 @ 06:20      PE:  Abdomen: Mildly distended, mild uterine  tenderness edematous at the peniculus   incision c/d/i.  Extremities: No erythema,+2  edema    A/P: 39yo POD#2 s/p LTCS. due to CAT II poorly controlled GDMA2, improving leukocytosis  and GHTN    - Continue regular diet.  - Increase ambulation.  - Continue Motrin, Tylenol, oxycodone PRN for pain control.  vs. continue PCEA for pain.  -  CBC reviewed   - Monitor BP's closely    Gisell Bowman M.D., M.B.A., M.S.
Associate Chief of L & D ( late entry)    OB Progress Note:  Delivery, POD#3    S: 39yo POD#3 s/p LTCS .  patient is complaining of abdominal pain     O:   Vital Signs Last 24 Hrs  T(C): 36.3 (27 Mar 2024 09:46), Max: 36.7 (26 Mar 2024 16:47)  T(F): 97.4 (27 Mar 2024 09:46), Max: 98.1 (26 Mar 2024 16:47)  HR: 77 (27 Mar 2024 09:46) (72 - 78)  BP: 129/80 (27 Mar 2024 09:46) (110/74 - 130/79)  RR: 18 (27 Mar 2024 09:46) (17 - 18)  SpO2: 99% (27 Mar 2024 09:46) (99% - 100%)        Labs:  Blood type: O Positive  Rubella IgG: RPR: Negative  LABS:                        8.8    15.47 )-----------( 225      ( 26 Mar 2024 06:00 )             28.7                           9.8<L>   20.48<H> >-----------< 267    (  @ 03:10 )             31.1<L>                        10.5<L>   16.53<H> >-----------< 352    (  @ 01:40 )             33.1<L>    - @ 06:20      132<L>  |  106  |  7   ----------------------------<  146<H>  4.0   |  18<L>  |  0.55            PE:  Abdomen: Mildly distended, mild uterine  tenderness edematous at the peniculus   incision c/d/i.  Extremities: No erythema,+2  edema    A/P: 39yo POD#3 s/p LTCS. due to CAT II poorly controlled GDMA2, improving leukocytosis  and GHTN    -  Patient is stable for discharge and will follow up in the [facility] clinic  - Monitor BP's closely    Gisell Bowman M.D., M.B.A., M.S.

## 2024-03-28 ENCOUNTER — APPOINTMENT (OUTPATIENT)
Dept: OBGYN | Facility: HOSPITAL | Age: 39
End: 2024-03-28

## 2024-03-29 ENCOUNTER — OUTPATIENT (OUTPATIENT)
Dept: INPATIENT UNIT | Facility: HOSPITAL | Age: 39
LOS: 1 days | Discharge: HOME | End: 2024-03-29
Payer: MEDICAID

## 2024-03-29 ENCOUNTER — EMERGENCY (EMERGENCY)
Facility: HOSPITAL | Age: 39
LOS: 1 days | Discharge: STILL ADMITTED OR ADMINISTRATIVE | End: 2024-03-29
Admitting: EMERGENCY MEDICINE
Payer: MEDICAID

## 2024-03-29 VITALS
OXYGEN SATURATION: 98 % | HEART RATE: 89 BPM | RESPIRATION RATE: 18 BRPM | DIASTOLIC BLOOD PRESSURE: 85 MMHG | TEMPERATURE: 98 F | HEIGHT: 67 IN | SYSTOLIC BLOOD PRESSURE: 126 MMHG

## 2024-03-29 DIAGNOSIS — O26.899 OTHER SPECIFIED PREGNANCY RELATED CONDITIONS, UNSPECIFIED TRIMESTER: ICD-10-CM

## 2024-03-29 LAB
ALBUMIN SERPL ELPH-MCNC: 3 G/DL — LOW (ref 3.3–5)
ALP SERPL-CCNC: 165 U/L — HIGH (ref 40–120)
ALT FLD-CCNC: 28 U/L — SIGNIFICANT CHANGE UP (ref 4–33)
ANION GAP SERPL CALC-SCNC: 11 MMOL/L — SIGNIFICANT CHANGE UP (ref 7–14)
APTT BLD: 33.4 SEC — SIGNIFICANT CHANGE UP (ref 24.5–35.6)
AST SERPL-CCNC: 30 U/L — SIGNIFICANT CHANGE UP (ref 4–32)
BASOPHILS # BLD AUTO: 0.07 K/UL — SIGNIFICANT CHANGE UP (ref 0–0.2)
BASOPHILS NFR BLD AUTO: 0.6 % — SIGNIFICANT CHANGE UP (ref 0–2)
BILIRUB SERPL-MCNC: <0.2 MG/DL — SIGNIFICANT CHANGE UP (ref 0.2–1.2)
BUN SERPL-MCNC: 11 MG/DL — SIGNIFICANT CHANGE UP (ref 7–23)
CALCIUM SERPL-MCNC: 9 MG/DL — SIGNIFICANT CHANGE UP (ref 8.4–10.5)
CHLORIDE SERPL-SCNC: 106 MMOL/L — SIGNIFICANT CHANGE UP (ref 98–107)
CO2 SERPL-SCNC: 23 MMOL/L — SIGNIFICANT CHANGE UP (ref 22–31)
CREAT SERPL-MCNC: 0.56 MG/DL — SIGNIFICANT CHANGE UP (ref 0.5–1.3)
EGFR: 118 ML/MIN/1.73M2 — SIGNIFICANT CHANGE UP
EOSINOPHIL # BLD AUTO: 0.5 K/UL — SIGNIFICANT CHANGE UP (ref 0–0.5)
EOSINOPHIL NFR BLD AUTO: 4.3 % — SIGNIFICANT CHANGE UP (ref 0–6)
FIBRINOGEN PPP-MCNC: 480 MG/DL — HIGH (ref 200–465)
GLUCOSE SERPL-MCNC: 71 MG/DL — SIGNIFICANT CHANGE UP (ref 70–99)
HCT VFR BLD CALC: 29.6 % — LOW (ref 34.5–45)
HGB BLD-MCNC: 9.1 G/DL — LOW (ref 11.5–15.5)
IANC: 7.38 K/UL — SIGNIFICANT CHANGE UP (ref 1.8–7.4)
IMM GRANULOCYTES NFR BLD AUTO: 1.1 % — HIGH (ref 0–0.9)
INR BLD: 0.91 RATIO — SIGNIFICANT CHANGE UP (ref 0.85–1.18)
LDH SERPL L TO P-CCNC: 229 U/L — HIGH (ref 135–225)
LYMPHOCYTES # BLD AUTO: 2.69 K/UL — SIGNIFICANT CHANGE UP (ref 1–3.3)
LYMPHOCYTES # BLD AUTO: 23.1 % — SIGNIFICANT CHANGE UP (ref 13–44)
MCHC RBC-ENTMCNC: 24.7 PG — LOW (ref 27–34)
MCHC RBC-ENTMCNC: 30.7 GM/DL — LOW (ref 32–36)
MCV RBC AUTO: 80.4 FL — SIGNIFICANT CHANGE UP (ref 80–100)
MONOCYTES # BLD AUTO: 0.86 K/UL — SIGNIFICANT CHANGE UP (ref 0–0.9)
MONOCYTES NFR BLD AUTO: 7.4 % — SIGNIFICANT CHANGE UP (ref 2–14)
NEUTROPHILS # BLD AUTO: 7.38 K/UL — SIGNIFICANT CHANGE UP (ref 1.8–7.4)
NEUTROPHILS NFR BLD AUTO: 63.5 % — SIGNIFICANT CHANGE UP (ref 43–77)
NRBC # BLD: 0 /100 WBCS — SIGNIFICANT CHANGE UP (ref 0–0)
NRBC # FLD: 0 K/UL — SIGNIFICANT CHANGE UP (ref 0–0)
PLATELET # BLD AUTO: 307 K/UL — SIGNIFICANT CHANGE UP (ref 150–400)
POTASSIUM SERPL-MCNC: 4.5 MMOL/L — SIGNIFICANT CHANGE UP (ref 3.5–5.3)
POTASSIUM SERPL-SCNC: 4.5 MMOL/L — SIGNIFICANT CHANGE UP (ref 3.5–5.3)
PROT SERPL-MCNC: 6.4 G/DL — SIGNIFICANT CHANGE UP (ref 6–8.3)
PROTHROM AB SERPL-ACNC: 10.3 SEC — SIGNIFICANT CHANGE UP (ref 9.5–13)
RBC # BLD: 3.68 M/UL — LOW (ref 3.8–5.2)
RBC # FLD: 15.7 % — HIGH (ref 10.3–14.5)
SODIUM SERPL-SCNC: 140 MMOL/L — SIGNIFICANT CHANGE UP (ref 135–145)
URATE SERPL-MCNC: 5.4 MG/DL — SIGNIFICANT CHANGE UP (ref 2.5–7)
WBC # BLD: 11.63 K/UL — HIGH (ref 3.8–10.5)
WBC # FLD AUTO: 11.63 K/UL — HIGH (ref 3.8–10.5)

## 2024-03-29 PROCEDURE — L9996: CPT

## 2024-03-29 PROCEDURE — 99214 OFFICE O/P EST MOD 30 MIN: CPT

## 2024-03-29 PROCEDURE — 93970 EXTREMITY STUDY: CPT | Mod: 26

## 2024-03-29 RX ORDER — DIPHENHYDRAMINE HCL 50 MG
25 CAPSULE ORAL ONCE
Refills: 0 | Status: COMPLETED | OUTPATIENT
Start: 2024-03-29 | End: 2024-03-29

## 2024-03-29 RX ORDER — METOCLOPRAMIDE HCL 10 MG
10 TABLET ORAL ONCE
Refills: 0 | Status: COMPLETED | OUTPATIENT
Start: 2024-03-29 | End: 2024-03-29

## 2024-03-29 RX ORDER — ACETAMINOPHEN 500 MG
1000 TABLET ORAL ONCE
Refills: 0 | Status: COMPLETED | OUTPATIENT
Start: 2024-03-29 | End: 2024-03-29

## 2024-03-29 RX ADMIN — Medication 25 MILLIGRAM(S): at 20:23

## 2024-03-29 RX ADMIN — Medication 1000 MILLIGRAM(S): at 20:22

## 2024-03-29 RX ADMIN — Medication 10 MILLIGRAM(S): at 20:23

## 2024-03-29 NOTE — OB POSTPARTUM TRIAGE NOTE - HISTORY OF PRESENT ILLNESS
HPI: Pt is a 37yo  sp pLTCS from 3/24/24 secondary to Category 2 FHR tracing.  Patient presenting to triage with complaints of headache 8/10, elevated BPs from home 143/100, right sided leg pain and bilateral swelling. Patient denies taking any tylenol or motrin at home.  Patient denies visual disturbances, epigastric change, c/p, difficulty breathing, dizziness.   PNC complicated by A2           HPI: Pt is a 39yo  sp pLTCS from 3/24/24 secondary to Category 2 FHR tracing.  Patient presenting to triage with complaints of headache 8/10, elevated BPs from home 143/100, right sided leg pain and bilateral swelling. Patient denies taking any Tylenol or Motrin at home.  Patient denies visual disturbances, epigastric change, c/p, difficulty breathing, dizziness.   PNC complicated by A2, PEC

## 2024-03-29 NOTE — OB POSTPARTUM TRIAGE NOTE - NSHPLABSRESULTS_GEN_ALL_CORE
9.1    11.63 )-----------( 307      ( 29 Mar 2024 20:18 )             29.6     03-29    140  |  106  |  11  ----------------------------<  71  4.5   |  23  |  0.56    Ca    9.0      29 Mar 2024 20:18    TPro  6.4  /  Alb  3.0<L>  /  TBili  <0.2  /  DBili  x   /  AST  30  /  ALT  28  /  AlkPhos  165<H>  03-29    Bilateral lower extremities dopplers negative

## 2024-03-29 NOTE — ED ADULT TRIAGE NOTE - CHIEF COMPLAINT QUOTE
Patient presents to ED with complaints of high blood pressure of 143/100 today at home and b/l leg swelling x1 week. Patient s/p  on 3/24/24. Report given to L&D provider Ani. Patient also complains of right leg pain rating 10 out of 10 currently. FS 98. Patient denies any headache, dizziness, nausea, vomiting or diarrhea at this time. Patient able to speak in clear sentences, respirations equal and unlabored on room air. PH: anemia, preeclampsia, gestational diabetes Patient presents to ED with complaints of high blood pressure of 143/100 today at home and b/l leg swelling x1 week. Patient s/p  on 3/24/24. Report given to L&D provider karma Law to be transported with ED tech. Patient also complains of right leg pain rating 10 out of 10 currently. FS 98. Patient denies any headache, dizziness, nausea, vomiting or diarrhea at this time. Patient able to speak in clear sentences, respirations equal and unlabored on room air. PH: anemia, preeclampsia, gestational diabetes

## 2024-03-29 NOTE — OB POSTPARTUM TRIAGE NOTE - PLAN OF CARE
-reviewed BP monitoring with patient  -s/s of PEC and when to return to triage  -Patient to f/u with clinic for PP visit -reviewed BP monitoring with patient  -s/s of PEC and when to return to triage  -Patient to f/u with clinic for PP visit  -Patient to f/u with Cardio OB

## 2024-03-29 NOTE — OB POSTPARTUM TRIAGE NOTE - NSHPPHYSICALEXAM_GEN_ALL_CORE
Vital Signs Last 24 Hrs  T(C): 36.6 (29 Mar 2024 18:29), Max: 36.6 (29 Mar 2024 18:29)  T(F): 97.9 (29 Mar 2024 18:29), Max: 97.9 (29 Mar 2024 18:29)  HR: 89 (29 Mar 2024 18:29) (89 - 89)  BP: 126/85 (29 Mar 2024 18:29) (126/85 - 126/85)  BP(mean): --  RR: 18 (29 Mar 2024 18:29) (18 - 18)  SpO2: 98% (29 Mar 2024 18:29) (98% - 98%)    VSS:   BP: 120/60, 105/56, 145/70, 127/65, 126/70  HR: 60-70s      Physical Exam:  Gen: NAD, AxOx3  CV: RRR  Resp: CTAB  Abd: soft, NT, incision C/D/I  Extremities: No edema noted, no redness  Bilateral lower extremities dopplers negative    Headache resolved post tylenol, reglan and benadryl

## 2024-03-30 PROBLEM — E03.9 HYPOTHYROIDISM, UNSPECIFIED: Chronic | Status: ACTIVE | Noted: 2024-03-24

## 2024-03-30 PROBLEM — D64.9 ANEMIA, UNSPECIFIED: Chronic | Status: ACTIVE | Noted: 2024-03-24

## 2024-04-02 DIAGNOSIS — M79.89 OTHER SPECIFIED SOFT TISSUE DISORDERS: ICD-10-CM

## 2024-04-02 DIAGNOSIS — R51.9 HEADACHE, UNSPECIFIED: ICD-10-CM

## 2024-04-16 ENCOUNTER — APPOINTMENT (OUTPATIENT)
Dept: OBGYN | Facility: HOSPITAL | Age: 39
End: 2024-04-16

## 2024-04-16 ENCOUNTER — OUTPATIENT (OUTPATIENT)
Dept: OUTPATIENT SERVICES | Facility: HOSPITAL | Age: 39
LOS: 1 days | End: 2024-04-16

## 2024-04-16 ENCOUNTER — INPATIENT (INPATIENT)
Facility: HOSPITAL | Age: 39
LOS: 1 days | Discharge: HOME | End: 2024-04-18
Attending: SPECIALIST | Admitting: SPECIALIST
Payer: MEDICAID

## 2024-04-16 ENCOUNTER — APPOINTMENT (OUTPATIENT)
Dept: ANTEPARTUM | Facility: CLINIC | Age: 39
End: 2024-04-16

## 2024-04-16 VITALS
BODY MASS INDEX: 36.41 KG/M2 | WEIGHT: 232 LBS | HEIGHT: 67 IN | SYSTOLIC BLOOD PRESSURE: 142 MMHG | TEMPERATURE: 98.2 F | HEART RATE: 79 BPM | RESPIRATION RATE: 20 BRPM | DIASTOLIC BLOOD PRESSURE: 95 MMHG

## 2024-04-16 VITALS — SYSTOLIC BLOOD PRESSURE: 87 MMHG | DIASTOLIC BLOOD PRESSURE: 70 MMHG

## 2024-04-16 DIAGNOSIS — O09.529 SUPERVISION OF ELDERLY MULTIGRAVIDA, UNSPECIFIED TRIMESTER: ICD-10-CM

## 2024-04-16 DIAGNOSIS — Z98.891 HISTORY OF UTERINE SCAR FROM PREVIOUS SURGERY: Chronic | ICD-10-CM

## 2024-04-16 DIAGNOSIS — O26.899 OTHER SPECIFIED PREGNANCY RELATED CONDITIONS, UNSPECIFIED TRIMESTER: ICD-10-CM

## 2024-04-16 DIAGNOSIS — O24.419 GESTATIONAL DIABETES MELLITUS IN PREGNANCY, UNSPECIFIED CONTROL: ICD-10-CM

## 2024-04-16 DIAGNOSIS — O14.10 SEVERE PRE-ECLAMPSIA, UNSPECIFIED TRIMESTER: ICD-10-CM

## 2024-04-16 LAB
ALBUMIN SERPL ELPH-MCNC: 3.8 G/DL — SIGNIFICANT CHANGE UP (ref 3.3–5)
ALP SERPL-CCNC: 174 U/L — HIGH (ref 40–120)
ALT FLD-CCNC: 30 U/L — SIGNIFICANT CHANGE UP (ref 4–33)
ANION GAP SERPL CALC-SCNC: 10 MMOL/L — SIGNIFICANT CHANGE UP (ref 7–14)
APTT BLD: 38.3 SEC — HIGH (ref 24.5–35.6)
AST SERPL-CCNC: 30 U/L — SIGNIFICANT CHANGE UP (ref 4–32)
BASOPHILS # BLD AUTO: 0.08 K/UL — SIGNIFICANT CHANGE UP (ref 0–0.2)
BASOPHILS NFR BLD AUTO: 0.7 % — SIGNIFICANT CHANGE UP (ref 0–2)
BILIRUB SERPL-MCNC: <0.2 MG/DL — SIGNIFICANT CHANGE UP (ref 0.2–1.2)
BLD GP AB SCN SERPL QL: NEGATIVE — SIGNIFICANT CHANGE UP
BUN SERPL-MCNC: 14 MG/DL — SIGNIFICANT CHANGE UP (ref 7–23)
CALCIUM SERPL-MCNC: 8.7 MG/DL — SIGNIFICANT CHANGE UP (ref 8.4–10.5)
CHLORIDE SERPL-SCNC: 106 MMOL/L — SIGNIFICANT CHANGE UP (ref 98–107)
CO2 SERPL-SCNC: 23 MMOL/L — SIGNIFICANT CHANGE UP (ref 22–31)
CREAT SERPL-MCNC: 0.61 MG/DL — SIGNIFICANT CHANGE UP (ref 0.5–1.3)
EGFR: 116 ML/MIN/1.73M2 — SIGNIFICANT CHANGE UP
EOSINOPHIL # BLD AUTO: 0.52 K/UL — HIGH (ref 0–0.5)
EOSINOPHIL NFR BLD AUTO: 4.8 % — SIGNIFICANT CHANGE UP (ref 0–6)
FIBRINOGEN PPP-MCNC: 349 MG/DL — SIGNIFICANT CHANGE UP (ref 200–465)
GLUCOSE SERPL-MCNC: 90 MG/DL — SIGNIFICANT CHANGE UP (ref 70–99)
HCT VFR BLD CALC: 36.2 % — SIGNIFICANT CHANGE UP (ref 34.5–45)
HGB BLD-MCNC: 11 G/DL — LOW (ref 11.5–15.5)
IANC: 6.48 K/UL — SIGNIFICANT CHANGE UP (ref 1.8–7.4)
IMM GRANULOCYTES NFR BLD AUTO: 0.4 % — SIGNIFICANT CHANGE UP (ref 0–0.9)
INR BLD: 0.95 RATIO — SIGNIFICANT CHANGE UP (ref 0.85–1.18)
LDH SERPL L TO P-CCNC: 330 U/L — HIGH (ref 135–225)
LYMPHOCYTES # BLD AUTO: 28.1 % — SIGNIFICANT CHANGE UP (ref 13–44)
LYMPHOCYTES # BLD AUTO: 3.05 K/UL — SIGNIFICANT CHANGE UP (ref 1–3.3)
MCHC RBC-ENTMCNC: 24.9 PG — LOW (ref 27–34)
MCHC RBC-ENTMCNC: 30.4 GM/DL — LOW (ref 32–36)
MCV RBC AUTO: 81.9 FL — SIGNIFICANT CHANGE UP (ref 80–100)
MONOCYTES # BLD AUTO: 0.68 K/UL — SIGNIFICANT CHANGE UP (ref 0–0.9)
MONOCYTES NFR BLD AUTO: 6.3 % — SIGNIFICANT CHANGE UP (ref 2–14)
NEUTROPHILS # BLD AUTO: 6.48 K/UL — SIGNIFICANT CHANGE UP (ref 1.8–7.4)
NEUTROPHILS NFR BLD AUTO: 59.7 % — SIGNIFICANT CHANGE UP (ref 43–77)
NRBC # BLD: 0 /100 WBCS — SIGNIFICANT CHANGE UP (ref 0–0)
NRBC # FLD: 0 K/UL — SIGNIFICANT CHANGE UP (ref 0–0)
PLATELET # BLD AUTO: 393 K/UL — SIGNIFICANT CHANGE UP (ref 150–400)
POTASSIUM SERPL-MCNC: 4.7 MMOL/L — SIGNIFICANT CHANGE UP (ref 3.5–5.3)
POTASSIUM SERPL-SCNC: 4.7 MMOL/L — SIGNIFICANT CHANGE UP (ref 3.5–5.3)
PROT SERPL-MCNC: 7.2 G/DL — SIGNIFICANT CHANGE UP (ref 6–8.3)
PROTHROM AB SERPL-ACNC: 10.8 SEC — SIGNIFICANT CHANGE UP (ref 9.5–13)
RBC # BLD: 4.42 M/UL — SIGNIFICANT CHANGE UP (ref 3.8–5.2)
RBC # FLD: 15.3 % — HIGH (ref 10.3–14.5)
RH IG SCN BLD-IMP: POSITIVE — SIGNIFICANT CHANGE UP
SODIUM SERPL-SCNC: 139 MMOL/L — SIGNIFICANT CHANGE UP (ref 135–145)
URATE SERPL-MCNC: 5.6 MG/DL — SIGNIFICANT CHANGE UP (ref 2.5–7)
WBC # BLD: 10.85 K/UL — HIGH (ref 3.8–10.5)
WBC # FLD AUTO: 10.85 K/UL — HIGH (ref 3.8–10.5)

## 2024-04-16 PROCEDURE — XXXXX: CPT | Mod: 1L

## 2024-04-16 PROCEDURE — 70450 CT HEAD/BRAIN W/O DYE: CPT | Mod: 26

## 2024-04-16 RX ORDER — DIPHENHYDRAMINE HCL 50 MG
25 CAPSULE ORAL EVERY 4 HOURS
Refills: 0 | Status: DISCONTINUED | OUTPATIENT
Start: 2024-04-16 | End: 2024-04-18

## 2024-04-16 RX ORDER — MAGNESIUM SULFATE 500 MG/ML
4 VIAL (ML) INJECTION ONCE
Refills: 0 | Status: COMPLETED | OUTPATIENT
Start: 2024-04-16 | End: 2024-04-16

## 2024-04-16 RX ORDER — MAGNESIUM SULFATE 500 MG/ML
2 VIAL (ML) INJECTION
Qty: 40 | Refills: 0 | Status: DISCONTINUED | OUTPATIENT
Start: 2024-04-16 | End: 2024-04-16

## 2024-04-16 RX ORDER — IBUPROFEN 200 MG
600 TABLET ORAL ONCE
Refills: 0 | Status: COMPLETED | OUTPATIENT
Start: 2024-04-16 | End: 2024-04-16

## 2024-04-16 RX ORDER — MAGNESIUM SULFATE 500 MG/ML
2 VIAL (ML) INJECTION
Qty: 40 | Refills: 0 | Status: DISCONTINUED | OUTPATIENT
Start: 2024-04-16 | End: 2024-04-18

## 2024-04-16 RX ORDER — SODIUM CHLORIDE 9 MG/ML
1000 INJECTION, SOLUTION INTRAVENOUS
Refills: 0 | Status: DISCONTINUED | OUTPATIENT
Start: 2024-04-16 | End: 2024-04-18

## 2024-04-16 RX ORDER — NIFEDIPINE 30 MG
30 TABLET, EXTENDED RELEASE 24 HR ORAL DAILY
Refills: 0 | Status: DISCONTINUED | OUTPATIENT
Start: 2024-04-16 | End: 2024-04-18

## 2024-04-16 RX ORDER — IBUPROFEN 200 MG
800 TABLET ORAL ONCE
Refills: 0 | Status: DISCONTINUED | OUTPATIENT
Start: 2024-04-16 | End: 2024-04-16

## 2024-04-16 RX ORDER — ACETAMINOPHEN 500 MG
1000 TABLET ORAL ONCE
Refills: 0 | Status: COMPLETED | OUTPATIENT
Start: 2024-04-16 | End: 2024-04-16

## 2024-04-16 RX ADMIN — Medication 25 MILLIGRAM(S): at 23:29

## 2024-04-16 RX ADMIN — Medication 600 MILLIGRAM(S): at 16:30

## 2024-04-16 RX ADMIN — SODIUM CHLORIDE 50 MILLILITER(S): 9 INJECTION, SOLUTION INTRAVENOUS at 17:35

## 2024-04-16 RX ADMIN — Medication 1000 MILLIGRAM(S): at 16:30

## 2024-04-16 RX ADMIN — Medication 300 GRAM(S): at 17:35

## 2024-04-16 RX ADMIN — Medication 50 GM/HR: at 18:05

## 2024-04-16 RX ADMIN — Medication 50 GM/HR: at 19:57

## 2024-04-16 RX ADMIN — Medication 1000 MILLIGRAM(S): at 15:21

## 2024-04-16 RX ADMIN — Medication 30 MILLIGRAM(S): at 17:23

## 2024-04-16 RX ADMIN — Medication 50 GM/HR: at 20:51

## 2024-04-16 RX ADMIN — SODIUM CHLORIDE 50 MILLILITER(S): 9 INJECTION, SOLUTION INTRAVENOUS at 18:08

## 2024-04-16 RX ADMIN — Medication 600 MILLIGRAM(S): at 15:21

## 2024-04-16 NOTE — OB POSTPARTUM TRIAGE NOTE - HISTORY OF PRESENT ILLNESS
Ms. JACQUES TIMMONS is a 38y  PP day #23 s/p pltC/S after failed IOL for GDMA2 for NRNST on 24 today referred from PCAP for elevated BPs @ routine visit. + Frontal 6-7 HA since this AM, feels diff than usual migraines. Notes occasional vision blurriness, not present currently. Notes left leg pain, neg dopplers for 3/29. Last took ibuprofen 800mg last night for leg pain.   PNC: PCAP.  C/S for NRNST. Seen in triage  for r/o DVT and preeclampsia, discharged. Hx of preeclampsia with pregnancy in 2019, notes received magnesium. Pt denies complications with blood pressure and/or blood sugar.  Ms. JACQUES TIMMONS is a 38y  PP day #23 s/p pltC/S after failed IOL for GDMA2 for NRNST on 24 today referred from PCAP for elevated BPs @ routine visit. + Frontal 6-7 HA since this AM, feels diff than usual migraines. Notes occasional vision blurriness, not present currently. Notes left leg pain, neg dopplers for 3/29. Last took ibuprofen 800mg last night for leg pain.   PNC: PCAP.  C/S for NRNST. While on L&D she also met criteria for PEC w/o severe features by moderate range BPs and an elevated P/C ratio. Seen in triage  for r/o DVT and preeclampsia, discharged. Hx of preeclampsia with pregnancy in 2019, notes received magnesium. Pt denies complications with blood pressure and/or blood sugar.

## 2024-04-16 NOTE — OB POSTPARTUM TRIAGE NOTE - NSOBPROVIDERNOTE_OBGYN_ALL_OB_FT
Ms. JACQUES TIMMONS is a 38y  PP day #23 s/p pltC/S here with labile BPs without evidence of preeclampsia.     - Notes worsening of HA to 10/10    Plan  - Ibuprofen 800, Tylenol 1000mg   - Reevaluate pain Ms. JACQUES TIMMONS is a 38y  PP day #23 s/p pltC/S here with sPEC (unrelieved HA).     - Notes that HA is unchanged at 7/10. Notes that medication has not helped at all.     Plan  - Admit to PP for sPEC  - BP monitoring   - CT head  - Magnesium  - Procardia 30XL daily     Plan d/w Dr. Ortiz, PGY3, d/w Dr. Fontenot

## 2024-04-16 NOTE — OB POSTPARTUM TRIAGE NOTE - NSHPPHYSICALEXAM_GEN_ALL_CORE
General: Female sitting comfortably in no apparent distress.   Head: Normocephalic. Atraumatic.   Eyes: No discharge, lids normal, conjunctiva normal  Lungs: No resp distress  Neuro: No facial asymmetry, no slurred speech, moves all 4 extremities  Mood: Alert and lucid, appropriate mood and affect

## 2024-04-16 NOTE — H&P ADULT - ASSESSMENT
Ms. JACQUES TIMMONS is a 38y  PP day #23 s/p pltC/S here with sPEC (unrelieved HA).     - Notes that HA is unchanged at 7/10. Notes that medication has not helped at all.     Plan  - Admit to PP for sPEC  - BP monitoring   - CT head, spoke w rad neuro, ct wo contrast recommended @ this time, if still concerned can order MR venogram   - Magnesium  - Procardia 30XL daily     Plan d/w Dr. Ortiz, PGY3, d/w Dr. Fontenot

## 2024-04-16 NOTE — OB POSTPARTUM TRIAGE NOTE - NSNAMEOFMDOFFICE_OBGYN_ALL_OB_FT
"Chief Complaint   Patient presents with     RECHECK     colonoscopy and labs       Initial /79  Pulse 85  Temp 97.5  F (36.4  C) (Oral)  Wt 166 lb (75.3 kg)  SpO2 100%  BMI 25.62 kg/m2 Estimated body mass index is 25.62 kg/(m^2) as calculated from the following:    Height as of 2/1/18: 5' 7.5\" (1.715 m).    Weight as of this encounter: 166 lb (75.3 kg).  Medication Reconciliation: complete     Tiny Amaral cma    "
Clinic

## 2024-04-16 NOTE — H&P ADULT - HISTORY OF PRESENT ILLNESS
Ms. JACQUES TIMMONS is a 38y  PP day #23 s/p pltC/S after failed IOL for GDMA2 for NRNST on 24 today referred from PCAP for elevated BPs @ routine visit. + Frontal 6-7 HA since this AM, feels diff than usual migraines. Notes occasional vision blurriness, not present currently. Notes left leg pain, neg dopplers for 3/29. Last took ibuprofen 800mg last night for leg pain.   PNC: PCAP.  C/S for NRNST. While on L&D she also met criteria for PEC w/o severe features by moderate range BPs and an elevated P/C ratio. Seen in triage  for r/o DVT and preeclampsia, discharged. Hx of preeclampsia with pregnancy in 2019, notes received magnesium. Pt denies complications with blood pressure and/or blood sugar.

## 2024-04-16 NOTE — OB POSTPARTUM TRIAGE NOTE - NSHPLABSRESULTS_GEN_ALL_CORE
11.0   10.85 )-----------( 393      ( 16 Apr 2024 14:52 )             36.2   04-16    139  |  106  |  14  ----------------------------<  90  4.7   |  23  |  0.61    Ca    8.7      16 Apr 2024 14:52    TPro  7.2  /  Alb  3.8  /  TBili  <0.2  /  DBili  x   /  AST  30  /  ALT  30  /  AlkPhos  174<H>  04-16

## 2024-04-16 NOTE — HISTORY OF PRESENT ILLNESS
[Postpartum Follow Up] : postpartum follow up [Complications:___] : complications include: [unfilled] [Last Pap Date: ___] : Last Pap Date: [unfilled] [Delivery Date: ___] : on [unfilled] [Primary C/S] : delivered by  section [Female] : Delivery History: baby girl [Wt. ___] : weighing [unfilled] [Discharge HCT: ___] : hematocrit level was [unfilled] [Discharge HGB: ___] : hemoglobin level was [unfilled] [Intended Contraception] : Intended Contraception: [Oral Contraceptives] : oral contraceptives [Headache] : headache [Clean/Dry/Intact] : clean, dry and intact [Pertussis Vaccine] : Pertussis vaccine was not administered [BTL] : no tubal ligation [Resumed Menses] : has not resumed her menses [Resumed Brea] : has not resumed intercourse [FreeTextEntry8] : Here for wound check and BP check. BP today 140/100. Complaining of headache and dizziness this morning. Has been monitoring BPs at home 130s/80-90s. was seen in ER 3/29 with leg pain- had dopplers negative. [de-identified] : Incision healing . Instructed to keep clean and dry. No heavy lifting. BP today 142/95 manual 140/100. states she has been monitoring BP at home 130s/80-90s. Complaining of headaches since this morning and swelling legs. case discussed with Dr Bro- sent to labor and delivery for evaluation. Will refer to cardiac OB. Has rx norethindrone. Will schedule 2 hour GTT at 6 week visit. Breastfeeding/happy with baby. 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 Will order mammogram post partum.Sent to labor and delivery for evaluation. MHegarty NP

## 2024-04-17 DIAGNOSIS — Z51.89 ENCOUNTER FOR OTHER SPECIFIED AFTERCARE: ICD-10-CM

## 2024-04-17 DIAGNOSIS — Z98.891 HISTORY OF UTERINE SCAR FROM PREVIOUS SURGERY: ICD-10-CM

## 2024-04-17 DIAGNOSIS — Z01.30 ENCOUNTER FOR EXAMINATION OF BLOOD PRESSURE WITHOUT ABNORMAL FINDINGS: ICD-10-CM

## 2024-04-17 LAB
ALBUMIN SERPL ELPH-MCNC: 3.3 G/DL — SIGNIFICANT CHANGE UP (ref 3.3–5)
ALP SERPL-CCNC: 161 U/L — HIGH (ref 40–120)
ALT FLD-CCNC: 24 U/L — SIGNIFICANT CHANGE UP (ref 4–33)
ANION GAP SERPL CALC-SCNC: 10 MMOL/L — SIGNIFICANT CHANGE UP (ref 7–14)
APTT BLD: 37.8 SEC — HIGH (ref 24.5–35.6)
AST SERPL-CCNC: 17 U/L — SIGNIFICANT CHANGE UP (ref 4–32)
BASOPHILS # BLD AUTO: 0.08 K/UL — SIGNIFICANT CHANGE UP (ref 0–0.2)
BASOPHILS NFR BLD AUTO: 1 % — SIGNIFICANT CHANGE UP (ref 0–2)
BILIRUB SERPL-MCNC: <0.2 MG/DL — SIGNIFICANT CHANGE UP (ref 0.2–1.2)
BUN SERPL-MCNC: 11 MG/DL — SIGNIFICANT CHANGE UP (ref 7–23)
CALCIUM SERPL-MCNC: 7 MG/DL — LOW (ref 8.4–10.5)
CHLORIDE SERPL-SCNC: 104 MMOL/L — SIGNIFICANT CHANGE UP (ref 98–107)
CO2 SERPL-SCNC: 23 MMOL/L — SIGNIFICANT CHANGE UP (ref 22–31)
CREAT SERPL-MCNC: 0.59 MG/DL — SIGNIFICANT CHANGE UP (ref 0.5–1.3)
EGFR: 117 ML/MIN/1.73M2 — SIGNIFICANT CHANGE UP
EOSINOPHIL # BLD AUTO: 0.61 K/UL — HIGH (ref 0–0.5)
EOSINOPHIL NFR BLD AUTO: 7.6 % — HIGH (ref 0–6)
FIBRINOGEN PPP-MCNC: 318 MG/DL — SIGNIFICANT CHANGE UP (ref 200–465)
GLUCOSE SERPL-MCNC: 92 MG/DL — SIGNIFICANT CHANGE UP (ref 70–99)
HCT VFR BLD CALC: 35.1 % — SIGNIFICANT CHANGE UP (ref 34.5–45)
HGB BLD-MCNC: 10.5 G/DL — LOW (ref 11.5–15.5)
IANC: 4.76 K/UL — SIGNIFICANT CHANGE UP (ref 1.8–7.4)
IMM GRANULOCYTES NFR BLD AUTO: 0.2 % — SIGNIFICANT CHANGE UP (ref 0–0.9)
INR BLD: 0.91 RATIO — SIGNIFICANT CHANGE UP (ref 0.85–1.18)
LDH SERPL L TO P-CCNC: 169 U/L — SIGNIFICANT CHANGE UP (ref 135–225)
LYMPHOCYTES # BLD AUTO: 2.11 K/UL — SIGNIFICANT CHANGE UP (ref 1–3.3)
LYMPHOCYTES # BLD AUTO: 26.2 % — SIGNIFICANT CHANGE UP (ref 13–44)
MAGNESIUM SERPL-MCNC: 5.7 MG/DL — HIGH (ref 1.6–2.6)
MAGNESIUM SERPL-MCNC: 6.4 MG/DL — HIGH (ref 1.6–2.6)
MAGNESIUM SERPL-MCNC: 6.8 MG/DL — HIGH (ref 1.6–2.6)
MCHC RBC-ENTMCNC: 24.5 PG — LOW (ref 27–34)
MCHC RBC-ENTMCNC: 29.9 GM/DL — LOW (ref 32–36)
MCV RBC AUTO: 82 FL — SIGNIFICANT CHANGE UP (ref 80–100)
MONOCYTES # BLD AUTO: 0.48 K/UL — SIGNIFICANT CHANGE UP (ref 0–0.9)
MONOCYTES NFR BLD AUTO: 6 % — SIGNIFICANT CHANGE UP (ref 2–14)
NEUTROPHILS # BLD AUTO: 4.76 K/UL — SIGNIFICANT CHANGE UP (ref 1.8–7.4)
NEUTROPHILS NFR BLD AUTO: 59 % — SIGNIFICANT CHANGE UP (ref 43–77)
NRBC # BLD: 0 /100 WBCS — SIGNIFICANT CHANGE UP (ref 0–0)
NRBC # FLD: 0 K/UL — SIGNIFICANT CHANGE UP (ref 0–0)
PLATELET # BLD AUTO: 354 K/UL — SIGNIFICANT CHANGE UP (ref 150–400)
POTASSIUM SERPL-MCNC: 4.1 MMOL/L — SIGNIFICANT CHANGE UP (ref 3.5–5.3)
POTASSIUM SERPL-SCNC: 4.1 MMOL/L — SIGNIFICANT CHANGE UP (ref 3.5–5.3)
PROT SERPL-MCNC: 6.2 G/DL — SIGNIFICANT CHANGE UP (ref 6–8.3)
PROTHROM AB SERPL-ACNC: 10.2 SEC — SIGNIFICANT CHANGE UP (ref 9.5–13)
RBC # BLD: 4.28 M/UL — SIGNIFICANT CHANGE UP (ref 3.8–5.2)
RBC # FLD: 15.3 % — HIGH (ref 10.3–14.5)
SODIUM SERPL-SCNC: 137 MMOL/L — SIGNIFICANT CHANGE UP (ref 135–145)
URATE SERPL-MCNC: 6 MG/DL — SIGNIFICANT CHANGE UP (ref 2.5–7)
WBC # BLD: 8.06 K/UL — SIGNIFICANT CHANGE UP (ref 3.8–10.5)
WBC # FLD AUTO: 8.06 K/UL — SIGNIFICANT CHANGE UP (ref 3.8–10.5)

## 2024-04-17 PROCEDURE — 70496 CT ANGIOGRAPHY HEAD: CPT | Mod: 26

## 2024-04-17 RX ORDER — LEVOTHYROXINE SODIUM 125 MCG
50 TABLET ORAL DAILY
Refills: 0 | Status: DISCONTINUED | OUTPATIENT
Start: 2024-04-17 | End: 2024-04-18

## 2024-04-17 RX ORDER — METOCLOPRAMIDE HCL 10 MG
10 TABLET ORAL ONCE
Refills: 0 | Status: COMPLETED | OUTPATIENT
Start: 2024-04-17 | End: 2024-04-17

## 2024-04-17 RX ORDER — ACETAMINOPHEN 500 MG
650 TABLET ORAL EVERY 6 HOURS
Refills: 0 | Status: DISCONTINUED | OUTPATIENT
Start: 2024-04-17 | End: 2024-04-18

## 2024-04-17 RX ORDER — IBUPROFEN 200 MG
600 TABLET ORAL EVERY 6 HOURS
Refills: 0 | Status: DISCONTINUED | OUTPATIENT
Start: 2024-04-17 | End: 2024-04-18

## 2024-04-17 RX ADMIN — Medication 10 MILLIGRAM(S): at 13:53

## 2024-04-17 RX ADMIN — Medication 25 MILLIGRAM(S): at 13:53

## 2024-04-17 RX ADMIN — Medication 50 MICROGRAM(S): at 06:19

## 2024-04-17 RX ADMIN — Medication 650 MILLIGRAM(S): at 13:00

## 2024-04-17 RX ADMIN — Medication 30 MILLIGRAM(S): at 17:25

## 2024-04-17 RX ADMIN — Medication 650 MILLIGRAM(S): at 12:09

## 2024-04-17 RX ADMIN — Medication 50 GM/HR: at 07:07

## 2024-04-18 ENCOUNTER — TRANSCRIPTION ENCOUNTER (OUTPATIENT)
Age: 39
End: 2024-04-18

## 2024-04-18 VITALS
TEMPERATURE: 98 F | HEART RATE: 82 BPM | DIASTOLIC BLOOD PRESSURE: 62 MMHG | OXYGEN SATURATION: 100 % | SYSTOLIC BLOOD PRESSURE: 113 MMHG | RESPIRATION RATE: 18 BRPM

## 2024-04-18 DIAGNOSIS — O14.90 UNSPECIFIED PRE-ECLAMPSIA, UNSPECIFIED TRIMESTER: ICD-10-CM

## 2024-04-18 PROCEDURE — 99232 SBSQ HOSP IP/OBS MODERATE 35: CPT | Mod: GC

## 2024-04-18 RX ORDER — NIFEDIPINE 30 MG
1 TABLET, EXTENDED RELEASE 24 HR ORAL
Qty: 30 | Refills: 0
Start: 2024-04-18 | End: 2024-05-17

## 2024-04-18 RX ADMIN — Medication 50 MICROGRAM(S): at 05:41

## 2024-04-18 NOTE — DISCHARGE NOTE PROVIDER - CARE PROVIDER_API CALL
OB CLINIC at [facility],   C level of oncology building at [facility]  Phone: [phone]  Fax: (   )    -  Follow Up Time:

## 2024-04-18 NOTE — DISCHARGE NOTE PROVIDER - PROVIDER TOKENS
FREE:[LAST:[OB CLINIC at [facility]],PHONE:[phone]],FAX:[(   [phone]    -],ADDRESS:[C level of oncology building at [facility]]]

## 2024-04-18 NOTE — PROGRESS NOTE ADULT - NSPROGADDITIONALINFOA_GEN_ALL_CORE
M Fellow Addendum    Patient is a 37 YO  with hypothyroidism POD24 from primary  section for category 2 fetal heart tracing with postpartum course complicated by preeclampsia without severe features now readmitted for preeclampsia with severe features by headache. Patient reports headache has completely resolved and she is feeling well. Normotensive on procardia XL 30 mg daily. Serum preeclampsia labs unremarkable. CT head and CT head venogram both unremarkable. Patient is ready for discharge home. Patient will have blood pressure check early next week and she has follow-up with cardio obstetrics.    We reviewed the diagnosis of preeclampsia with severe features and the need for home blood pressure monitoring and return precautions. We reviewed that preeclampsia is a risk factor for chronic hypertension and heart disease long term. We reviewed patient is to hold NIfedipine if blood pressure <110/60.    Ivy Romero MD  [facility] Fellow  Attg: Dr. Aguayo

## 2024-04-18 NOTE — PROGRESS NOTE ADULT - SUBJECTIVE AND OBJECTIVE BOX
Postpartum Note, HD#2    Interval events:    Patient seen and examined at bedside, no acute overnight events. Reports HA has resolved. No acute complaints. Denies epigastric pain, blurred vision, CP, SOB, N/V, fevers, and chills.    Vital Signs Last 24 Hours  T(C): 36.6 (04-17-24 @ 06:45), Max: 36.7 (04-17-24 @ 01:00)  HR: 77 (04-17-24 @ 06:45) (71 - 77)  BP: 114/76 (04-17-24 @ 06:45) (87/70 - 126/79)  RR: 18 (04-17-24 @ 06:45) (16 - 18)  SpO2: 99% (04-17-24 @ 06:45) (97% - 99%)    Physical Exam:  General: NAD  Abdomen: Soft, non-tender, fundus firm  Ext: No pain or swelling    Labs:             10.5   8.06  )-----------( 354      ( 04-17 @ 06:31 )             35.1     04-17 @ 06:31    137  |  104  |  11  ----------------------------<  92  4.1   |  23  |  0.59    Ca    7.0      04-17 @ 06:31  Mg     6.40     04-17 @ 06:31    TPro  6.2  /  Alb  3.3  /  TBili  <0.2  /  DBili  x   /  AST  17  /  ALT  24  /  AlkPhos  161  04-17 @ 06:31    PT/INR - ( 04-17 @ 06:31 )   PT: 10.2 sec;   INR: 0.91 ratio    PTT - ( 04-17 @ 06:31 )  PTT:37.8 sec    Uric Acid: (04-17 @ 06:31)  6.0      Fibrinogen: (04-17 @ 06:31)  --       LDH: (04-17 @ 06:31)  169        MEDICATIONS  (STANDING):  lactated ringers. 1000 milliLiter(s) (50 mL/Hr) IV Continuous <Continuous>  levothyroxine 50 MICROGram(s) Oral daily  magnesium sulfate Infusion 2 Gm/Hr (50 mL/Hr) IV Continuous <Continuous>  NIFEdipine XL 30 milliGRAM(s) Oral daily    MEDICATIONS  (PRN):  diphenhydrAMINE 25 milliGRAM(s) Oral every 4 hours PRN Rash and/or Itching  
Postpartum Note     Interval events:    Patient seen and examined at bedside, no acute overnight events. No acute complaints. Denies HA, epigastric pain, blurred vision, CP, SOB, N/V, fevers, and chills.    Vital Signs Last 24 Hours  T(C): 36.6 (04-18-24 @ 06:59), Max: 36.9 (04-17-24 @ 16:45)  HR: 103 (04-18-24 @ 06:59) (72 - 103)  BP: 104/68 (04-18-24 @ 06:59) (104/68 - 121/71)  RR: 18 (04-18-24 @ 06:59) (17 - 19)  SpO2: 99% (04-18-24 @ 06:59) (98% - 99%)    Physical Exam:  General: NAD  Abdomen: Soft, non-tender, fundus firm  Ext: No pain or swelling    Labs:             10.5   8.06  )-----------( 354      ( 04-17 @ 06:31 )             35.1     04-17 @ 06:31    137  |  104  |  11  ----------------------------<  92  4.1   |  23  |  0.59    Ca    7.0      04-17 @ 06:31  Mg     6.80     04-17 @ 12:00    TPro  6.2  /  Alb  3.3  /  TBili  <0.2  /  DBili  x   /  AST  17  /  ALT  24  /  AlkPhos  161  04-17 @ 06:31    PT/INR - ( 04-17 @ 06:31 )   PT: 10.2 sec;   INR: 0.91 ratio    PTT - ( 04-17 @ 06:31 )  PTT:37.8 sec    Uric Acid: (04-17 @ 06:31)  6.0      Fibrinogen: (04-17 @ 06:31)  --       LDH: (04-17 @ 06:31)  169        MEDICATIONS  (STANDING):  lactated ringers. 1000 milliLiter(s) (50 mL/Hr) IV Continuous <Continuous>  levothyroxine 50 MICROGram(s) Oral daily  NIFEdipine XL 30 milliGRAM(s) Oral daily    MEDICATIONS  (PRN):  acetaminophen     Tablet .. 650 milliGRAM(s) Oral every 6 hours PRN Mild Pain (1 - 3), Moderate Pain (4 - 6)  diphenhydrAMINE 25 milliGRAM(s) Oral every 4 hours PRN Rash and/or Itching  ibuprofen  Tablet. 600 milliGRAM(s) Oral every 6 hours PRN Moderate Pain (4 - 6)

## 2024-04-18 NOTE — DISCHARGE NOTE PROVIDER - NSDCFUSCHEDAPPT_GEN_ALL_CORE_FT
Alina Wan Physician [facility]  CARDIOLOGY [phone]  Scheduled Appointment: 05/14/2024

## 2024-04-18 NOTE — DISCHARGE NOTE PROVIDER - HOSPITAL COURSE
f38y P2 POD#25 s/p pLTCS for Cat 2 c/b PEC without SF, readmitted with PEC w/ SF (unrelieved HA). Patient stable overnight. HA resolved and BP well controlled.     #sPEC/HA  - CT head notable forNo acute intracranial hemorrhage, mass effect, or shift of   the midline structures. Right-sided tympanomastoid cavity opacification.    - Magnesium for seizure prophylaxis(4/16-17)  - BP's well controlled overnight  - Cont Procardia 30 QD  - HELLP labs wnl   - cont to monitor for S/s sPEC  - d/c home with BP cuff 38y P2 POD#25 s/p pLTCS for Cat 2 c/b PEC without SF, readmitted with PEC w/ SF (unrelieved HA). Patient stable overnight. HA resolved and BP well controlled.   - CT head notable forNo acute intracranial hemorrhage, mass effect, or shift of   the midline structures. Right-sided tympanomastoid cavity opacification.  --- Pt reports having seen ENT for this since childhood.   - Magnesium for seizure prophylaxis(4/16-17).  Cont Procardia 30 QD. HELLP labs wnl

## 2024-04-18 NOTE — PROGRESS NOTE ADULT - ASSESSMENT
38y P2 POD#25 s/p pLTCS for Cat 2 c/b PEC without SF, readmitted with PEC w/ SF (unrelieved HA). Patient stable overnight. HA resolved and BP well controlled.     #sPEC/HA  - CT head notable forNo acute intracranial hemorrhage, mass effect, or shift of   the midline structures. Right-sided tympanomastoid cavity opacification.    - Magnesium for seizure prophylaxis(4/16-17)  - BP's well controlled overnight  - Cont Procardia 30 QD  - HELLP labs wnl   - cont to monitor for S/s sPEC  - d/c home with BP cuff     #postpartum   - regular diet  - HSQ for DVT ppx    S. Ortiz-Henriquez PGY3     
 38y P2 POD#24 s/p pLTCS for Cat 2 c/b PEC without SF, readmitted with PEC w/ SF (unrelieved HA). Patient stable overnight. HA resolved and BP well controlled.     #sPEC/HA  - CT head notable forNo acute intracranial hemorrhage, mass effect, or shift of   the midline structures. Right-sided tympanomastoid cavity opacification.    - Magnesium for seizure prophylaxis(4/16-)  - BP's well controlled overnight  - Cont Procardia 30 QD  - HELLP labs wnl, f/u AM CBC/CMP  - cont to monitor for S/s sPEC  - d/c home with BP cuff     #postpartum   - regular diet  - HSQ for DVT ppx    S. Ortiz-Henriquez PGY3

## 2024-04-18 NOTE — DISCHARGE NOTE NURSING/CASE MANAGEMENT/SOCIAL WORK - PATIENT PORTAL LINK FT
You can access the FollowMyHealth Patient Portal offered by [facility] by registering at the following website: [email or web address]. By joining [employer]’s FollowMyHealth portal, you will also be able to view your health information using other applications (apps) compatible with our system.

## 2024-04-18 NOTE — DISCHARGE NOTE PROVIDER - NSDCMRMEDTOKEN_GEN_ALL_CORE_FT
acetaminophen 325 mg oral tablet: 3 tab(s) orally every 6 hours  Blood pressure cuff: Please take your blood pressure 2-3 times  per day  ibuprofen 600 mg oral tablet: 1 tab(s) orally every 6 hours  levothyroxine 50 mcg (0.05 mg) oral tablet: 1 tab(s) orally once a day  norethindrone 0.35 mg oral tablet: 1 tab(s) orally once a day Please take one pill at the same time every day for birth control MDD: 1 tablet  oxyCODONE 5 mg oral tablet: 1 tab(s) orally every 6 hours as needed for  severe pain MDD: 4 tablets   Blood pressure cuff: Please take your blood pressure 2-3 times  per day  levothyroxine 50 mcg (0.05 mg) oral tablet: 1 tab(s) orally once a day  NIFEdipine 30 mg oral tablet, extended release: 1 tab(s) orally once a day hold if BP is below 110/60

## 2024-04-18 NOTE — DISCHARGE NOTE PROVIDER - NSFOLLOWUPCLINICS_GEN_ALL_ED_FT
[facility]  OB/GYN & Surg  [phone] [place] [address]  Phone: [phone]  Fax:

## 2024-04-18 NOTE — PROGRESS NOTE ADULT - ATTENDING COMMENTS
SLP following  MBSS showing global weakness in swallowing as well as aspiration with thin liquids.   ENT consulted but patient did not tolerate laryngoscopy     Plan   - Discussed case with Rheumatology team, they are concerned that this dysphagia may have been from prior stroke, requesting imaging for evaluation-  CT demonstrated no acute findings or causes for dysphagia NOR did MRI   - removed NGT and place dobhoff which is more comfortable and makes swallowing easier compared to NGT.    - continue working with speech therapy   -exam concerning for thrush; nystatin swish and swallow initiated; GI consulted as concern that oropharyngeal candidiasis may be contributing to dysphagia  -Per GI, Lower suspicion for esophageal candidiasis without odynophagia however can If white plaques have been seen on oropharynx, ok to start fluconazole empirically for possible esophageal candidiasis  -tentatively plan for EGD on 11/12 if persistent dysphagia   Patient normotensive without s/s of worsening PEC.  Stable for discharge with outpatient follow up.

## 2024-04-18 NOTE — DISCHARGE NOTE PROVIDER - NSDCCPCAREPLAN_GEN_ALL_CORE_FT
PRINCIPAL DISCHARGE DIAGNOSIS  Diagnosis: Preeclampsia in postpartum period  Assessment and Plan of Treatment:      PRINCIPAL DISCHARGE DIAGNOSIS  Diagnosis: Preeclampsia in postpartum period  Assessment and Plan of Treatment: - Continue BP meds as prescribed (hold is BP is under 110/60)  -Take blood pressure with at home cuff prior to taking medications; if BP is >150/90 call MD  - Return to hospital with headaches, visual changes, abdominal pain, nausea, vomiting, chest pain or shortness of breathe  - Follow up with OB in 2 days for BP check  - Follow up with [facility] 5/14/24

## 2024-04-18 NOTE — DISCHARGE NOTE PROVIDER - NSDCFUADDAPPT_GEN_ALL_CORE_FT
- Continue BP meds as prescribed (hold is BP is under 110/60)  -Take blood pressure with at home cuff prior to taking medications; if BP is >150/90 call MD  - Return to hospital with headaches, visual changes, abdominal pain, nausea, vomiting, chest pain or shortness of breathe  - Follow up with OB in 2 days for BP check  - Follow up with [facility] 5/14/24

## 2024-05-09 RX ORDER — UBIDECARENONE/VIT E ACET 100MG-5
50 MCG CAPSULE ORAL
Qty: 60 | Refills: 1 | Status: DISCONTINUED | COMMUNITY
Start: 2024-01-24 | End: 2024-05-09

## 2024-05-09 RX ORDER — GUAIFENESIN 100 MG/5ML
100 LIQUID ORAL EVERY 4 HOURS
Qty: 420 | Refills: 3 | Status: DISCONTINUED | COMMUNITY
Start: 2023-12-18 | End: 2024-05-09

## 2024-05-09 RX ORDER — CLOBETASOL PROPIONATE 0.5 MG/G
0.05 GEL TOPICAL TWICE DAILY
Qty: 1 | Refills: 3 | Status: DISCONTINUED | COMMUNITY
Start: 2022-03-01 | End: 2024-05-09

## 2024-05-09 RX ORDER — LANCETS 33 GAUGE
EACH MISCELLANEOUS
Qty: 1 | Refills: 2 | Status: DISCONTINUED | COMMUNITY
Start: 2024-01-23 | End: 2024-05-09

## 2024-05-09 RX ORDER — FERROUS SULFATE 325(65) MG
325 (65 FE) TABLET ORAL DAILY
Qty: 90 | Refills: 0 | Status: DISCONTINUED | COMMUNITY
Start: 2024-01-24 | End: 2024-05-09

## 2024-05-09 RX ORDER — UBIDECARENONE/VIT E ACET 100MG-5
50 MCG CAPSULE ORAL
Qty: 60 | Refills: 1 | Status: DISCONTINUED | COMMUNITY
Start: 2023-11-13 | End: 2024-05-09

## 2024-05-09 RX ORDER — ISOPROPYL ALCOHOL 0.7 ML/ML
SWAB TOPICAL
Qty: 1 | Refills: 2 | Status: DISCONTINUED | COMMUNITY
Start: 2024-01-23 | End: 2024-05-09

## 2024-05-09 RX ORDER — BLOOD-GLUCOSE METER
EACH MISCELLANEOUS
Qty: 1 | Refills: 0 | Status: DISCONTINUED | COMMUNITY
Start: 2024-01-23 | End: 2024-05-09

## 2024-05-09 RX ORDER — PEN NEEDLE, DIABETIC 33 GX5/32"
33G X 4 MM NEEDLE, DISPOSABLE MISCELLANEOUS
Qty: 1 | Refills: 2 | Status: DISCONTINUED | COMMUNITY
Start: 2024-03-07 | End: 2024-05-09

## 2024-05-09 RX ORDER — HUMAN INSULIN 100 [IU]/ML
100 INJECTION, SUSPENSION SUBCUTANEOUS
Qty: 3 | Refills: 2 | Status: DISCONTINUED | COMMUNITY
Start: 2024-03-07 | End: 2024-05-09

## 2024-05-09 RX ORDER — POLYETHYLENE GLYCOL 3350 17 G/17G
17 POWDER, FOR SOLUTION ORAL DAILY
Qty: 1 | Refills: 3 | Status: DISCONTINUED | COMMUNITY
Start: 2024-01-24 | End: 2024-05-09

## 2024-05-09 RX ORDER — CHLORHEXIDINE GLUCONATE 4 %
325 (65 FE) LIQUID (ML) TOPICAL TWICE DAILY
Qty: 30 | Refills: 5 | Status: DISCONTINUED | COMMUNITY
Start: 2024-01-24 | End: 2024-05-09

## 2024-05-14 ENCOUNTER — APPOINTMENT (OUTPATIENT)
Dept: CARDIOLOGY | Facility: CLINIC | Age: 39
End: 2024-05-14
Payer: MEDICAID

## 2024-05-14 ENCOUNTER — OUTPATIENT (OUTPATIENT)
Dept: OUTPATIENT SERVICES | Facility: HOSPITAL | Age: 39
LOS: 1 days | End: 2024-05-14
Payer: MEDICAID

## 2024-05-14 ENCOUNTER — RESULT REVIEW (OUTPATIENT)
Age: 39
End: 2024-05-14

## 2024-05-14 ENCOUNTER — APPOINTMENT (OUTPATIENT)
Dept: OBGYN | Facility: HOSPITAL | Age: 39
End: 2024-05-14

## 2024-05-14 ENCOUNTER — NON-APPOINTMENT (OUTPATIENT)
Age: 39
End: 2024-05-14

## 2024-05-14 VITALS
DIASTOLIC BLOOD PRESSURE: 69 MMHG | HEIGHT: 67 IN | HEART RATE: 107 BPM | WEIGHT: 233 LBS | TEMPERATURE: 97.8 F | BODY MASS INDEX: 36.57 KG/M2 | SYSTOLIC BLOOD PRESSURE: 111 MMHG

## 2024-05-14 VITALS
OXYGEN SATURATION: 100 % | HEART RATE: 97 BPM | WEIGHT: 233 LBS | DIASTOLIC BLOOD PRESSURE: 76 MMHG | BODY MASS INDEX: 36.57 KG/M2 | SYSTOLIC BLOOD PRESSURE: 115 MMHG | HEIGHT: 67 IN

## 2024-05-14 DIAGNOSIS — Z30.011 ENCOUNTER FOR INITIAL PRESCRIPTION OF CONTRACEPTIVE PILLS: ICD-10-CM

## 2024-05-14 DIAGNOSIS — Z98.891 HISTORY OF UTERINE SCAR FROM PREVIOUS SURGERY: Chronic | ICD-10-CM

## 2024-05-14 DIAGNOSIS — Z86.39 PERSONAL HISTORY OF OTHER ENDOCRINE, NUTRITIONAL AND METABOLIC DISEASE: ICD-10-CM

## 2024-05-14 DIAGNOSIS — R87.810 CERVICAL HIGH RISK HUMAN PAPILLOMAVIRUS (HPV) DNA TEST POSITIVE: ICD-10-CM

## 2024-05-14 DIAGNOSIS — Z87.59 PERSONAL HISTORY OF OTHER COMPLICATIONS OF PREGNANCY, CHILDBIRTH AND THE PUERPERIUM: ICD-10-CM

## 2024-05-14 DIAGNOSIS — E66.9 OBESITY, UNSPECIFIED: ICD-10-CM

## 2024-05-14 DIAGNOSIS — G43.909 MIGRAINE, UNSPECIFIED, NOT INTRACTABLE, W/OUT STATUS MIGRAINOSUS: ICD-10-CM

## 2024-05-14 DIAGNOSIS — O24.410 GESTATIONAL DIABETES MELLITUS IN PREGNANCY, DIET CONTROLLED: ICD-10-CM

## 2024-05-14 LAB
BASOPHILS # BLD AUTO: 0.07 K/UL — SIGNIFICANT CHANGE UP (ref 0–0.2)
BASOPHILS NFR BLD AUTO: 0.8 % — SIGNIFICANT CHANGE UP (ref 0–2)
EOSINOPHIL # BLD AUTO: 0.26 K/UL — SIGNIFICANT CHANGE UP (ref 0–0.5)
EOSINOPHIL NFR BLD AUTO: 3.1 % — SIGNIFICANT CHANGE UP (ref 0–6)
HCT VFR BLD CALC: 38.2 % — SIGNIFICANT CHANGE UP (ref 34.5–45)
HGB BLD-MCNC: 11.9 G/DL — SIGNIFICANT CHANGE UP (ref 11.5–15.5)
IANC: 4.32 K/UL — SIGNIFICANT CHANGE UP (ref 1.8–7.4)
IMM GRANULOCYTES NFR BLD AUTO: 0.4 % — SIGNIFICANT CHANGE UP (ref 0–0.9)
LYMPHOCYTES # BLD AUTO: 2.95 K/UL — SIGNIFICANT CHANGE UP (ref 1–3.3)
LYMPHOCYTES # BLD AUTO: 35.6 % — SIGNIFICANT CHANGE UP (ref 13–44)
MCHC RBC-ENTMCNC: 25.2 PG — LOW (ref 27–34)
MCHC RBC-ENTMCNC: 31.2 GM/DL — LOW (ref 32–36)
MCV RBC AUTO: 80.9 FL — SIGNIFICANT CHANGE UP (ref 80–100)
MONOCYTES # BLD AUTO: 0.66 K/UL — SIGNIFICANT CHANGE UP (ref 0–0.9)
MONOCYTES NFR BLD AUTO: 8 % — SIGNIFICANT CHANGE UP (ref 2–14)
NEUTROPHILS # BLD AUTO: 4.32 K/UL — SIGNIFICANT CHANGE UP (ref 1.8–7.4)
NEUTROPHILS NFR BLD AUTO: 52.1 % — SIGNIFICANT CHANGE UP (ref 43–77)
NRBC # BLD: 0 /100 WBCS — SIGNIFICANT CHANGE UP (ref 0–0)
NRBC # FLD: 0 K/UL — SIGNIFICANT CHANGE UP (ref 0–0)
PLATELET # BLD AUTO: 375 K/UL — SIGNIFICANT CHANGE UP (ref 150–400)
RBC # BLD: 4.72 M/UL — SIGNIFICANT CHANGE UP (ref 3.8–5.2)
RBC # FLD: 14.8 % — HIGH (ref 10.3–14.5)
T4 FREE SERPL-MCNC: 2.7 NG/DL — HIGH (ref 0.9–1.8)
TSH SERPL-MCNC: <0.1 UIU/ML — LOW (ref 0.27–4.2)
WBC # BLD: 8.29 K/UL — SIGNIFICANT CHANGE UP (ref 3.8–10.5)
WBC # FLD AUTO: 8.29 K/UL — SIGNIFICANT CHANGE UP (ref 3.8–10.5)

## 2024-05-14 PROCEDURE — 88141 CYTOPATH C/V INTERPRET: CPT

## 2024-05-14 PROCEDURE — 93000 ELECTROCARDIOGRAM COMPLETE: CPT

## 2024-05-14 PROCEDURE — 99213 OFFICE O/P EST LOW 20 MIN: CPT

## 2024-05-14 PROCEDURE — 99205 OFFICE O/P NEW HI 60 MIN: CPT | Mod: 25

## 2024-05-14 RX ORDER — NORETHINDRONE 0.35 MG/1
0.35 TABLET ORAL DAILY
Qty: 1 | Refills: 5 | Status: ACTIVE | COMMUNITY
Start: 2024-05-14 | End: 1900-01-01

## 2024-05-14 RX ORDER — LEVOTHYROXINE SODIUM 0.07 MG/1
75 TABLET ORAL DAILY
Qty: 90 | Refills: 3 | Status: DISCONTINUED | COMMUNITY
Start: 2021-11-24 | End: 2024-05-14

## 2024-05-14 RX ORDER — TRIAMCINOLONE ACETONIDE 1 MG/G
0.1 CREAM TOPICAL TWICE DAILY
Qty: 1 | Refills: 1 | Status: DISCONTINUED | COMMUNITY
Start: 2023-10-26 | End: 2024-05-14

## 2024-05-14 NOTE — HISTORY OF PRESENT ILLNESS
[FreeTextEntry1] : Ms. JACQUES TIMMONS 38 year - old F with post partum PEC is here May 14, 2024 to establish care in the Women's heart health program. Patient carries a strong FH of heart disease, FH of PCI, DM and personal history of gDM ( was on insulin) and PP PEC was Nifedipine 30 mg, hypothyroidism on synthorid presents in for follow up.  of note, took meds last night BP was 150/80s   # PP PEC: BP Stable off meds  Encouraged Patient to monitor BP at home, keep a log, and report results back to us for evaluation. Based on the results, we will adjust medications as necessary. Additionally, encouraged a heart-healthy diet and exercise as tolerated. EKG with no acute changes.   # Obesity : BMI 36 Nutrition consult - Zoila Cesar

## 2024-05-14 NOTE — DISCUSSION/SUMMARY
[EKG obtained to assist in diagnosis and management of assessed problem(s)] : EKG obtained to assist in diagnosis and management of assessed problem(s) [FreeTextEntry1] : In summary, Ms. JACQUES TIMMONS 38 year - old F with post partum PEC carries a strong FH of heart disease, FH of PCI, DM and personal history of gDM ( was on insulin) and PP PEC was Nifedipine 30 mg, hypothyroidism on Synthroid presents in for follow up.   # PP PEC: BP Stable off meds  Encouraged Patient to monitor BP at home, keep a log, and report results back to us for evaluation. Based on the results, we will adjust medications as necessary. Additionally, encouraged a heart-healthy diet and exercise as tolerated. EKG with no acute changes.  FU 6 months ( will schedule for baseline cardiac work up - strong FH of heart disease and personal history of sPEC.

## 2024-05-14 NOTE — HISTORY OF PRESENT ILLNESS
[Complications:___] : complications include: [unfilled] [Gestational Diabetes] : gestational diabetes [Preeclampsia] : preeclampsia [Last Pap Date: ___] : Last Pap Date: [unfilled] [Delivery Date: ___] : on [unfilled] [Primary C/S] : delivered by  section [Female] : Delivery History: baby girl [Wt. ___] : weighing [unfilled] [Rhogam] : Rhogam was not administered [Rubella Vaccine] : Rubella vaccine was not administered [Pertussis Vaccine] : Pertussis vaccine was not administered [BTL] : no tubal ligation [Breastfeeding] : currently nursing [BF with Difficulty] : nursing without difficulty [Discharge HCT: ___] : hematocrit level was [unfilled] [Discharge HGB: ___] : hemoglobin level was [unfilled] [Resumed Menses] : has not resumed her menses [Resumed [place]] : has not resumed intercourse [Intended Contraception] : Intended Contraception: [Oral Contraceptives] : oral contraceptives [Clean/Dry/Intact] : clean, dry and intact [Back to Normal] : is back to normal in size [None] : no vaginal bleeding [Normal] : the vagina was normal [Cervix Sample Taken] : cervical sample taken for a Pap smear [FreeTextEntry8] : s/p c/s 3/24/24  [Examination Of The Breasts] : breasts are normal [FreeTextEntry9] : GDM, hypothyroidism, migraines, sciatica, PEC . Colpo done 10/12/24 with f/u pap recomended PP/  [de-identified] : c/p PEC without SF and c/s. Patient was re-admitted 2 weeks PP for PEC with severe features.  [de-identified] : POPs [de-identified] : Reporting numbness at incision site. Reporting muscle pain of legs. Reports migraines headaches that have continued.  [de-identified] : Mother and baby doing. Patient reports having help at home from  and 'in-laws'. Reports migraines but contributes to lack of sleep and is taking ibuprofen as needed. Reports muscle pain in legs. Taking levothyroxine 50mcg QD. Was discharge on nifedipine 30mg but patient has noted been taking it with normal BP until last night. Patient reports not feeling and had /82. Follows with PCP for thyroid. GTT was scheduled for today however patient did not present fasting.  [de-identified] : Cards OB appt scheduled for today. Cont. monitoring BP. PCP scheduled for tomorrow for hypothyroid management. TFTs today. Advised to review migrain and increase hydration and sleep if possible. Pt to present for GTT on Friday 5/17. Rx given for Mammo and sonogram to be scheduled for routine. CBC today. RTC 6 weeks for POP mainetenance or prn.  Pap and HPv repeat today. Resume all actitivies two weeks.

## 2024-05-15 DIAGNOSIS — O24.410 GESTATIONAL DIABETES MELLITUS IN PREGNANCY, DIET CONTROLLED: ICD-10-CM

## 2024-05-15 DIAGNOSIS — Z30.011 ENCOUNTER FOR INITIAL PRESCRIPTION OF CONTRACEPTIVE PILLS: ICD-10-CM

## 2024-05-15 DIAGNOSIS — E03.9 HYPOTHYROIDISM, UNSPECIFIED: ICD-10-CM

## 2024-05-15 DIAGNOSIS — Z87.59 PERSONAL HISTORY OF OTHER COMPLICATIONS OF PREGNANCY, CHILDBIRTH AND THE PUERPERIUM: ICD-10-CM

## 2024-05-15 DIAGNOSIS — G43.909 MIGRAINE, UNSPECIFIED, NOT INTRACTABLE, WITHOUT STATUS MIGRAINOSUS: ICD-10-CM

## 2024-05-15 DIAGNOSIS — R87.810 CERVICAL HIGH RISK HUMAN PAPILLOMAVIRUS (HPV) DNA TEST POSITIVE: ICD-10-CM

## 2024-05-15 LAB
HPV GENOTYPE 16: SIGNIFICANT CHANGE UP
HPV GENOTYPE 18/45: DETECTED
HPV HIGH+LOW RISK DNA PNL CVX: DETECTED

## 2024-05-17 ENCOUNTER — RESULT REVIEW (OUTPATIENT)
Age: 39
End: 2024-05-17

## 2024-05-17 ENCOUNTER — APPOINTMENT (OUTPATIENT)
Dept: OBGYN | Facility: HOSPITAL | Age: 39
End: 2024-05-17
Payer: MEDICAID

## 2024-05-17 ENCOUNTER — APPOINTMENT (OUTPATIENT)
Dept: OBGYN | Facility: HOSPITAL | Age: 39
End: 2024-05-17

## 2024-05-17 ENCOUNTER — OUTPATIENT (OUTPATIENT)
Dept: OUTPATIENT SERVICES | Facility: HOSPITAL | Age: 39
LOS: 1 days | End: 2024-05-17

## 2024-05-17 DIAGNOSIS — Z98.891 HISTORY OF UTERINE SCAR FROM PREVIOUS SURGERY: Chronic | ICD-10-CM

## 2024-05-17 LAB
GESTATIONAL GLUCOSE 2 HR (ADA): 156 MG/DL — HIGH (ref 70–152)
GTT 2H PNL SERPL: 87 MG/DL — SIGNIFICANT CHANGE UP (ref 70–91)

## 2024-05-17 PROCEDURE — 99211 OFF/OP EST MAY X REQ PHY/QHP: CPT

## 2024-05-20 ENCOUNTER — APPOINTMENT (OUTPATIENT)
Dept: ENDOCRINOLOGY | Facility: CLINIC | Age: 39
End: 2024-05-20
Payer: MEDICAID

## 2024-05-20 VITALS
BODY MASS INDEX: 36.1 KG/M2 | OXYGEN SATURATION: 99 % | HEART RATE: 86 BPM | DIASTOLIC BLOOD PRESSURE: 70 MMHG | WEIGHT: 230 LBS | SYSTOLIC BLOOD PRESSURE: 110 MMHG | HEIGHT: 67 IN

## 2024-05-20 DIAGNOSIS — R73.03 PREDIABETES.: ICD-10-CM

## 2024-05-20 DIAGNOSIS — O24.419 GESTATIONAL DIABETES MELLITUS IN PREGNANCY, UNSPECIFIED CONTROL: ICD-10-CM

## 2024-05-20 DIAGNOSIS — E03.9 HYPOTHYROIDISM, UNSPECIFIED: ICD-10-CM

## 2024-05-20 PROCEDURE — G2211 COMPLEX E/M VISIT ADD ON: CPT | Mod: NC,1L

## 2024-05-20 PROCEDURE — 99204 OFFICE O/P NEW MOD 45 MIN: CPT

## 2024-05-20 NOTE — PHYSICAL EXAM
[TextEntry] : PHYSICAL EXAMINATION: Vital signs from today's encounter reviewed.  GENERAL: No acute distress, clinically eukinetic, normal appearance HEAD: Normocephalic, atraumatic EYES: conjunctivae are pink and moist, no icterus, no proptosis  NECK: thyroid is not enlarged/nodular on palpation, non-tender, no adenopathy CARDIOVASCULAR: well-perfused extremities, no peripheral edema RESPIRATORY: normal chest expansion with good pulmonary effort, no acute respiratory distress MUSCULOSKELETAL: no swelling, normal range of motion, normal gait SKIN: no pallor, no icterus, no rash  NEUROLOGIC: alert and oriented, no evident focal deficits, no tremors  PSYCHIATRIC: mood and affect are normal

## 2024-05-20 NOTE — ASSESSMENT
[FreeTextEntry1] : 1.  Hypothyroidism 2.  Iatrogenic hyperthyroidism versus postpartum thyroiditis  Prepregnancy dose of levothyroxine has been 50 mcg daily for the last 2 years.  Positive thyroid antibodies, suggestive of Hashimoto's thyroiditis.  She most likely has postpartum thyroiditis presenting with hyperthyroidism.  We discussed the usual course of thyroiditis, with initial hyperthyroidism followed by resolution to euthyroid versus hypothyroid.  RECOMMENDATIONS: -Check TSH, free T4, total T3, TSI and TRAb.  Unlikely Graves' disease, but will rule out by checking antibodies.  Most likely diagnosis is postpartum thyroiditis with component of iatrogenic hyperthyroidism from levothyroxine. -Continue to hold levothyroxine and recheck TFTs every 4 to 6 weeks until resolution of thyroiditis, and to see if she again develops hypothyroidism.  Will assess need for restarting levothyroxine accordingly. -Discussed using over-the-counter medications for symptom relief such as ibuprofen for bodyaches -We discussed using beta-blockers for symptomatic relief, but she does not have any significant symptoms of palpitations/tremors/anxiety.  Heart rate is controlled at visit. -We discussed that thyroiditis usually resolves on its own within 6 to 12 weeks, we will continue to monitor.  No indication for using steroids as she does not have painful thyroiditis.  3.  Prediabetes -Continue lifestyle modifications such as dietary modification and increased physical exercise -Recheck A1c  RTC in 3 to 4 months.  Lorri Harris MD [facility] at [address], [address] Ph: [phone] Fax: [phone]

## 2024-05-20 NOTE — REASON FOR VISIT
[Initial Evaluation] : an initial evaluation [Hyperthyroidism] : hyperthyroidism [Hypothyroidism] : hypothyroidism

## 2024-05-20 NOTE — REVIEW OF SYSTEMS
[TextEntry] : REVIEW OF SYSTEMS: General: + fatigue HEENT: + headaches Respiratory: No  shortness of breath  Cardiovascular: + palpitations, no leg swelling  Gastrointestinal: +constipation, no diarrhea  Integumentary: +skin/hair thinning  Musculoskeletal: + muscle aches, + joint pain Neurologic: No tremors Psychiatric: The patient is not currently nervous or anxious  Endocrine: No thyroid/neck swelling, + heat/cold intolerance   The review of systems is otherwise negative except as noted in HPI.

## 2024-05-20 NOTE — HISTORY OF PRESENT ILLNESS
[FreeTextEntry1] : CHIEF COMPLAINT: Hypothyroidism, iatrogenic hyperthyroidism REFERRED BY: OB/GYN   HISTORY OF PRESENTING ILLNESS: The patient is a 38-year-old female being seen in the office today for evaluation of hypothyroidism. She has a history of gestational diabetes and preeclampsia, recent delivery via  in 3/2024.  She was diagnosed with hypothyroidism at age 36, and started on levothyroxine.  Reports having headaches/migraines on levothyroxine 75 mcg daily, so dose was reduced to 50 mcg daily. She has been taking levothyroxine 50 mcg daily regularly, with the same dose continuing during recent pregnancy and then postpartum.  Labs 2024 with hyperthyroidism: TSH undetectable and free T4 2.7, while on levothyroxine 50 mcg daily.  As a result, she has stopped the levothyroxine around 2 days ago. Symptoms: She endorses body aches, muscle aches, legs feeling tight and stiff.  Endorses severe fatigue, has a history of migraines/headaches.  Endorses constipation, heat intolerance, palpitations.  She has occasional tremors, eczema and dry skin.  Endorses some throat pain, feels congested and like she has a ball of mucus in her throat.  No dyspnea or dysphonia, no neck lump.   No family history of thyroid disease or thyroid cancer. No personal history of radiation exposure in the head and neck area. No known history of thyroid nodules.  Vitamin D deficiency: Takes over-the-counter vitamin D supplements  Prediabetes/history of gestational diabetes: Reports that she was recommended to take insulin during the pregnancy, which she did not take.  Has never been on any diabetes medications.  2024 oral glucose tolerance test with glucose 156.  Reproductive history: First pregnancy in 2019, with a baby boy.  Elective  in .  Third pregnancy, with delivery in 3/2024, baby girl.  No further plans for any pregnancies.

## 2024-05-21 LAB
ESTIMATED AVERAGE GLUCOSE: 126 MG/DL
HBA1C MFR BLD HPLC: 6 %
T3 SERPL-MCNC: 147 NG/DL
T4 FREE SERPL-MCNC: 1.8 NG/DL
TSH SERPL-ACNC: 0.01 UIU/ML

## 2024-05-22 ENCOUNTER — NON-APPOINTMENT (OUTPATIENT)
Age: 39
End: 2024-05-22

## 2024-05-22 LAB — CYTOLOGY SPEC DOC CYTO: SIGNIFICANT CHANGE UP

## 2024-05-23 LAB
TSH RECEPTOR AB: <1.1 IU/L
TSI ACT/NOR SER: 1 IU/L

## 2024-06-13 ENCOUNTER — APPOINTMENT (OUTPATIENT)
Dept: OBGYN | Facility: HOSPITAL | Age: 39
End: 2024-06-13

## 2024-06-13 ENCOUNTER — NON-APPOINTMENT (OUTPATIENT)
Age: 39
End: 2024-06-13

## 2024-06-19 LAB
T3 SERPL-MCNC: 30 NG/DL
T4 FREE SERPL-MCNC: <0.1 NG/DL
TSH SERPL-ACNC: 48.1 UIU/ML

## 2024-06-19 RX ORDER — LEVOTHYROXINE SODIUM 0.05 MG/1
50 TABLET ORAL
Qty: 90 | Refills: 3 | Status: ACTIVE | COMMUNITY
Start: 2023-02-08 | End: 1900-01-01

## 2024-06-21 LAB — TSI ACT/NOR SER: 2.39 IU/L

## 2024-07-18 ENCOUNTER — APPOINTMENT (OUTPATIENT)
Dept: OBGYN | Facility: HOSPITAL | Age: 39
End: 2024-07-18

## 2024-07-18 ENCOUNTER — LABORATORY RESULT (OUTPATIENT)
Age: 39
End: 2024-07-18

## 2024-07-18 ENCOUNTER — OUTPATIENT (OUTPATIENT)
Dept: OUTPATIENT SERVICES | Facility: HOSPITAL | Age: 39
LOS: 1 days | End: 2024-07-18

## 2024-07-18 VITALS
SYSTOLIC BLOOD PRESSURE: 127 MMHG | DIASTOLIC BLOOD PRESSURE: 82 MMHG | WEIGHT: 243 LBS | TEMPERATURE: 98 F | BODY MASS INDEX: 38.14 KG/M2 | HEART RATE: 98 BPM | HEIGHT: 67 IN

## 2024-07-18 DIAGNOSIS — Z98.891 HISTORY OF UTERINE SCAR FROM PREVIOUS SURGERY: Chronic | ICD-10-CM

## 2024-07-18 LAB
HCG UR QL: NEGATIVE
QUALITY CONTROL: YES

## 2024-07-18 PROCEDURE — 57454 BX/CURETT OF CERVIX W/SCOPE: CPT | Mod: GC

## 2024-07-19 DIAGNOSIS — R87.810 CERVICAL HIGH RISK HUMAN PAPILLOMAVIRUS (HPV) DNA TEST POSITIVE: ICD-10-CM

## 2024-07-19 RX ORDER — LEVOTHYROXINE SODIUM 0.03 MG/1
25 TABLET ORAL
Qty: 1 | Refills: 3 | Status: ACTIVE | COMMUNITY
Start: 2024-07-19 | End: 1900-01-01

## 2024-08-02 ENCOUNTER — NON-APPOINTMENT (OUTPATIENT)
Age: 39
End: 2024-08-02

## 2024-08-06 ENCOUNTER — APPOINTMENT (OUTPATIENT)
Dept: OBGYN | Facility: HOSPITAL | Age: 39
End: 2024-08-06

## 2024-10-20 LAB
ESTIMATED AVERAGE GLUCOSE: 126 MG/DL
HBA1C MFR BLD HPLC: 6 %
T4 FREE SERPL-MCNC: 0.4 NG/DL
TSH SERPL-ACNC: 90.3 UIU/ML

## 2024-10-21 ENCOUNTER — APPOINTMENT (OUTPATIENT)
Dept: ENDOCRINOLOGY | Facility: CLINIC | Age: 39
End: 2024-10-21
Payer: MEDICAID

## 2024-10-21 DIAGNOSIS — E66.9 OBESITY, UNSPECIFIED: ICD-10-CM

## 2024-10-21 DIAGNOSIS — E03.9 HYPOTHYROIDISM, UNSPECIFIED: ICD-10-CM

## 2024-10-21 DIAGNOSIS — R73.03 PREDIABETES.: ICD-10-CM

## 2024-10-21 DIAGNOSIS — R63.5 ABNORMAL WEIGHT GAIN: ICD-10-CM

## 2024-10-21 PROCEDURE — 99214 OFFICE O/P EST MOD 30 MIN: CPT

## 2024-10-21 PROCEDURE — G2211 COMPLEX E/M VISIT ADD ON: CPT | Mod: NC

## 2024-11-12 ENCOUNTER — APPOINTMENT (OUTPATIENT)
Dept: CARDIOLOGY | Facility: CLINIC | Age: 39
End: 2024-11-12

## 2025-04-02 NOTE — OB RN INTRAOPERATIVE NOTE - NS_COUNTCORRECT_OBGYN_ALL_OB
[facility] provides services at a reduced cost to those who are determined to be eligible through [facility]’s financial assistance program. Information regarding [facility]’s financial assistance program can be found by going to [email or web address] or by calling [phone]. Yes

## 2025-05-16 ENCOUNTER — APPOINTMENT (OUTPATIENT)
Dept: ENDOCRINOLOGY | Facility: CLINIC | Age: 40
End: 2025-05-16

## 2025-08-10 NOTE — PATIENT PROFILE ADULT - PATIENT'S SEXUAL ORIENTATION
Monitor for new or worsening symptoms such as fever, chills, significantly worsening redness, swelling, pain, red streaking from the area.  If any of these occur please go to emergency department.  If symptoms have not improved in 48 hours please return or see PCP for recheck.     Heterosexual